# Patient Record
Sex: MALE | Race: BLACK OR AFRICAN AMERICAN | NOT HISPANIC OR LATINO | URBAN - METROPOLITAN AREA
[De-identification: names, ages, dates, MRNs, and addresses within clinical notes are randomized per-mention and may not be internally consistent; named-entity substitution may affect disease eponyms.]

---

## 2020-04-16 ENCOUNTER — INPATIENT (INPATIENT)
Facility: HOSPITAL | Age: 35
LOS: 20 days | Discharge: HOME | End: 2020-05-07
Attending: HOSPITALIST | Admitting: HOSPITALIST
Payer: MEDICAID

## 2020-04-16 VITALS
DIASTOLIC BLOOD PRESSURE: 78 MMHG | OXYGEN SATURATION: 90 % | RESPIRATION RATE: 21 BRPM | WEIGHT: 229.94 LBS | HEART RATE: 120 BPM | HEIGHT: 71 IN | SYSTOLIC BLOOD PRESSURE: 127 MMHG | TEMPERATURE: 100 F

## 2020-04-16 DIAGNOSIS — J96.00 ACUTE RESPIRATORY FAILURE, UNSPECIFIED WHETHER WITH HYPOXIA OR HYPERCAPNIA: ICD-10-CM

## 2020-04-16 DIAGNOSIS — U07.1 COVID-19: ICD-10-CM

## 2020-04-16 LAB
ALBUMIN SERPL ELPH-MCNC: 2.6 G/DL — LOW (ref 3.3–5)
ALP SERPL-CCNC: 55 U/L — SIGNIFICANT CHANGE UP (ref 40–120)
ALT FLD-CCNC: 48 U/L — SIGNIFICANT CHANGE UP (ref 12–78)
ANION GAP SERPL CALC-SCNC: 9 MMOL/L — SIGNIFICANT CHANGE UP (ref 5–17)
APTT BLD: 34 SEC — SIGNIFICANT CHANGE UP (ref 28.5–37)
AST SERPL-CCNC: 64 U/L — HIGH (ref 15–37)
BASE EXCESS BLDA CALC-SCNC: 1 MMOL/L — SIGNIFICANT CHANGE UP (ref -2–2)
BASOPHILS # BLD AUTO: 0.03 K/UL — SIGNIFICANT CHANGE UP (ref 0–0.2)
BASOPHILS NFR BLD AUTO: 0.3 % — SIGNIFICANT CHANGE UP (ref 0–2)
BILIRUB SERPL-MCNC: 0.4 MG/DL — SIGNIFICANT CHANGE UP (ref 0.2–1.2)
BLOOD GAS COMMENTS: SIGNIFICANT CHANGE UP
BLOOD GAS COMMENTS: SIGNIFICANT CHANGE UP
BLOOD GAS SOURCE: SIGNIFICANT CHANGE UP
BUN SERPL-MCNC: 12 MG/DL — SIGNIFICANT CHANGE UP (ref 7–23)
CALCIUM SERPL-MCNC: 8.6 MG/DL — SIGNIFICANT CHANGE UP (ref 8.5–10.1)
CHLORIDE SERPL-SCNC: 105 MMOL/L — SIGNIFICANT CHANGE UP (ref 96–108)
CO2 SERPL-SCNC: 26 MMOL/L — SIGNIFICANT CHANGE UP (ref 22–31)
CREAT SERPL-MCNC: 1.25 MG/DL — SIGNIFICANT CHANGE UP (ref 0.5–1.3)
CRP SERPL-MCNC: 21.36 MG/DL — HIGH (ref 0–0.4)
D DIMER BLD IA.RAPID-MCNC: 528 NG/ML DDU — HIGH
EOSINOPHIL # BLD AUTO: 0.02 K/UL — SIGNIFICANT CHANGE UP (ref 0–0.5)
EOSINOPHIL NFR BLD AUTO: 0.2 % — SIGNIFICANT CHANGE UP (ref 0–6)
FERRITIN SERPL-MCNC: 725 NG/ML — HIGH (ref 30–400)
FIBRINOGEN PPP-MCNC: 1213 MG/DL — HIGH (ref 350–510)
GLUCOSE SERPL-MCNC: 119 MG/DL — HIGH (ref 70–99)
HCO3 BLDA-SCNC: 25 MMOL/L — SIGNIFICANT CHANGE UP (ref 21–29)
HCT VFR BLD CALC: 36.9 % — LOW (ref 39–50)
HGB BLD-MCNC: 12.4 G/DL — LOW (ref 13–17)
HOROWITZ INDEX BLDA+IHG-RTO: 40 — SIGNIFICANT CHANGE UP
IMM GRANULOCYTES NFR BLD AUTO: 0.6 % — SIGNIFICANT CHANGE UP (ref 0–1.5)
INR BLD: 1.34 RATIO — HIGH (ref 0.88–1.16)
LACTATE SERPL-SCNC: 1.4 MMOL/L — SIGNIFICANT CHANGE UP (ref 0.7–2)
LDH SERPL L TO P-CCNC: 497 U/L — HIGH (ref 50–242)
LYMPHOCYTES # BLD AUTO: 1.13 K/UL — SIGNIFICANT CHANGE UP (ref 1–3.3)
LYMPHOCYTES # BLD AUTO: 10.8 % — LOW (ref 13–44)
MCHC RBC-ENTMCNC: 29.3 PG — SIGNIFICANT CHANGE UP (ref 27–34)
MCHC RBC-ENTMCNC: 33.6 GM/DL — SIGNIFICANT CHANGE UP (ref 32–36)
MCV RBC AUTO: 87.2 FL — SIGNIFICANT CHANGE UP (ref 80–100)
MONOCYTES # BLD AUTO: 0.43 K/UL — SIGNIFICANT CHANGE UP (ref 0–0.9)
MONOCYTES NFR BLD AUTO: 4.1 % — SIGNIFICANT CHANGE UP (ref 2–14)
NEUTROPHILS # BLD AUTO: 8.8 K/UL — HIGH (ref 1.8–7.4)
NEUTROPHILS NFR BLD AUTO: 84 % — HIGH (ref 43–77)
NRBC # BLD: 0 /100 WBCS — SIGNIFICANT CHANGE UP (ref 0–0)
PCO2 BLDA: 40 MMHG — SIGNIFICANT CHANGE UP (ref 32–46)
PH BLD: 7.41 — SIGNIFICANT CHANGE UP (ref 7.35–7.45)
PLATELET # BLD AUTO: 261 K/UL — SIGNIFICANT CHANGE UP (ref 150–400)
PO2 BLDA: 86 MMHG — SIGNIFICANT CHANGE UP (ref 74–108)
POTASSIUM SERPL-MCNC: 4.4 MMOL/L — SIGNIFICANT CHANGE UP (ref 3.5–5.3)
POTASSIUM SERPL-SCNC: 4.4 MMOL/L — SIGNIFICANT CHANGE UP (ref 3.5–5.3)
PROCALCITONIN SERPL-MCNC: 0.15 NG/ML — HIGH (ref 0.02–0.1)
PROT SERPL-MCNC: 8.4 GM/DL — HIGH (ref 6–8.3)
PROTHROM AB SERPL-ACNC: 15.2 SEC — HIGH (ref 10–12.9)
RBC # BLD: 4.23 M/UL — SIGNIFICANT CHANGE UP (ref 4.2–5.8)
RBC # FLD: 13.2 % — SIGNIFICANT CHANGE UP (ref 10.3–14.5)
SAO2 % BLDA: 96 % — SIGNIFICANT CHANGE UP (ref 92–96)
SARS-COV-2 RNA SPEC QL NAA+PROBE: DETECTED
SODIUM SERPL-SCNC: 140 MMOL/L — SIGNIFICANT CHANGE UP (ref 135–145)
WBC # BLD: 10.47 K/UL — SIGNIFICANT CHANGE UP (ref 3.8–10.5)
WBC # FLD AUTO: 10.47 K/UL — SIGNIFICANT CHANGE UP (ref 3.8–10.5)

## 2020-04-16 PROCEDURE — 99223 1ST HOSP IP/OBS HIGH 75: CPT

## 2020-04-16 PROCEDURE — 99285 EMERGENCY DEPT VISIT HI MDM: CPT

## 2020-04-16 PROCEDURE — 93010 ELECTROCARDIOGRAM REPORT: CPT

## 2020-04-16 PROCEDURE — 71045 X-RAY EXAM CHEST 1 VIEW: CPT | Mod: 26

## 2020-04-16 RX ORDER — TOCILIZUMAB 20 MG/ML
400 INJECTION, SOLUTION, CONCENTRATE INTRAVENOUS ONCE
Refills: 0 | Status: COMPLETED | OUTPATIENT
Start: 2020-04-16 | End: 2020-04-16

## 2020-04-16 RX ORDER — HYDROXYCHLOROQUINE SULFATE 200 MG
400 TABLET ORAL EVERY 24 HOURS
Refills: 0 | Status: COMPLETED | OUTPATIENT
Start: 2020-04-17 | End: 2020-04-20

## 2020-04-16 RX ORDER — HYDROXYCHLOROQUINE SULFATE 200 MG
TABLET ORAL
Refills: 0 | Status: COMPLETED | OUTPATIENT
Start: 2020-04-16 | End: 2020-04-20

## 2020-04-16 RX ORDER — ACETAMINOPHEN 500 MG
650 TABLET ORAL ONCE
Refills: 0 | Status: DISCONTINUED | OUTPATIENT
Start: 2020-04-16 | End: 2020-04-16

## 2020-04-16 RX ORDER — ATAZANAVIR 200 MG/1
300 CAPSULE ORAL DAILY
Refills: 0 | Status: DISCONTINUED | OUTPATIENT
Start: 2020-04-16 | End: 2020-04-16

## 2020-04-16 RX ORDER — ATAZANAVIR 200 MG/1
300 CAPSULE ORAL EVERY 24 HOURS
Refills: 0 | Status: COMPLETED | OUTPATIENT
Start: 2020-04-16 | End: 2020-04-20

## 2020-04-16 RX ORDER — PANTOPRAZOLE SODIUM 20 MG/1
40 TABLET, DELAYED RELEASE ORAL ONCE
Refills: 0 | Status: COMPLETED | OUTPATIENT
Start: 2020-04-16 | End: 2020-04-16

## 2020-04-16 RX ORDER — SODIUM CHLORIDE 9 MG/ML
1000 INJECTION INTRAMUSCULAR; INTRAVENOUS; SUBCUTANEOUS ONCE
Refills: 0 | Status: COMPLETED | OUTPATIENT
Start: 2020-04-16 | End: 2020-04-16

## 2020-04-16 RX ORDER — ENOXAPARIN SODIUM 100 MG/ML
40 INJECTION SUBCUTANEOUS EVERY 12 HOURS
Refills: 0 | Status: DISCONTINUED | OUTPATIENT
Start: 2020-04-16 | End: 2020-05-07

## 2020-04-16 RX ORDER — ALBUTEROL 90 UG/1
2 AEROSOL, METERED ORAL EVERY 4 HOURS
Refills: 0 | Status: DISCONTINUED | OUTPATIENT
Start: 2020-04-16 | End: 2020-05-07

## 2020-04-16 RX ORDER — GUAIFENESIN/DEXTROMETHORPHAN 600MG-30MG
10 TABLET, EXTENDED RELEASE 12 HR ORAL EVERY 4 HOURS
Refills: 0 | Status: DISCONTINUED | OUTPATIENT
Start: 2020-04-16 | End: 2020-05-07

## 2020-04-16 RX ORDER — ACETAMINOPHEN 500 MG
650 TABLET ORAL EVERY 4 HOURS
Refills: 0 | Status: DISCONTINUED | OUTPATIENT
Start: 2020-04-16 | End: 2020-05-07

## 2020-04-16 RX ORDER — HYDROXYCHLOROQUINE SULFATE 200 MG
800 TABLET ORAL EVERY 24 HOURS
Refills: 0 | Status: COMPLETED | OUTPATIENT
Start: 2020-04-16 | End: 2020-04-16

## 2020-04-16 RX ORDER — ONDANSETRON 8 MG/1
4 TABLET, FILM COATED ORAL EVERY 6 HOURS
Refills: 0 | Status: DISCONTINUED | OUTPATIENT
Start: 2020-04-16 | End: 2020-05-07

## 2020-04-16 RX ADMIN — Medication 800 MILLIGRAM(S): at 13:51

## 2020-04-16 RX ADMIN — ATAZANAVIR 300 MILLIGRAM(S): 200 CAPSULE ORAL at 13:50

## 2020-04-16 RX ADMIN — Medication 40 MILLIGRAM(S): at 17:57

## 2020-04-16 RX ADMIN — Medication 100 MILLIGRAM(S): at 23:20

## 2020-04-16 RX ADMIN — Medication 650 MILLIGRAM(S): at 15:52

## 2020-04-16 RX ADMIN — ENOXAPARIN SODIUM 40 MILLIGRAM(S): 100 INJECTION SUBCUTANEOUS at 17:57

## 2020-04-16 RX ADMIN — Medication 40 MILLIGRAM(S): at 12:18

## 2020-04-16 RX ADMIN — SODIUM CHLORIDE 1000 MILLILITER(S): 9 INJECTION INTRAMUSCULAR; INTRAVENOUS; SUBCUTANEOUS at 11:41

## 2020-04-16 RX ADMIN — Medication 10 MILLILITER(S): at 21:03

## 2020-04-16 RX ADMIN — TOCILIZUMAB 100 MILLIGRAM(S): 20 INJECTION, SOLUTION, CONCENTRATE INTRAVENOUS at 17:57

## 2020-04-16 RX ADMIN — PANTOPRAZOLE SODIUM 40 MILLIGRAM(S): 20 TABLET, DELAYED RELEASE ORAL at 12:21

## 2020-04-16 RX ADMIN — Medication 10 MILLILITER(S): at 17:58

## 2020-04-16 NOTE — H&P ADULT - NSHPPHYSICALEXAM_GEN_ALL_CORE
GENERAL: NAD, respiratory distress   HEAD:  Atraumatic, Normocephalic  EYES: EOMI, PERRLA, conjunctiva and sclera clear  ENMT: No tonsillar erythema, exudates, or enlargement; Moist mucous membranes, Good dentition, No lesions  NECK: Supple, No JVD, Normal thyroid  NERVOUS SYSTEM:  Alert & Oriented X3, Good concentration; Motor Strength 5/5 B/L upper and lower extremities; DTRs 2+ intact and symmetric  CHEST/LUNG: cough   HEART: Regular rate and rhythm; No murmurs, rubs, or gallops  ABDOMEN: Soft, Nontender, Nondistended; Bowel sounds present  EXTREMITIES:  2+ Peripheral Pulses, No clubbing, cyanosis, or edema  LYMPH: No lymphadenopathy noted  SKIN: No rashes or lesions

## 2020-04-16 NOTE — ED PROVIDER NOTE - OBJECTIVE STATEMENT
No PMHx +3 days of SOB/cough/congestion, nausea only able to tolerate soup, 2 days ago went to urgent care, at that time had CXR showing pneumonia consistent with COVID --  subsequently started on cefdinir and azithromycin, states that he has progressively worsened instead of improving, has been having high fevers to 103F, used APAP this AM for this approx 2 hrs pta, took 650mg. Denies other new symptoms of concern.

## 2020-04-16 NOTE — ED PROVIDER NOTE - CLINICAL SUMMARY MEDICAL DECISION MAKING FREE TEXT BOX
34M no pmhx presenting on day three of treatment with cefdinir + azithromycin for diagnosed pneumonia via XR, concern for COVID elevated given patient hx and non-improvement with antibiotics, will check COVID labs including nasal swab, follow up studies, reassess, dispo likely admission for hypoxia to 90% on RA.

## 2020-04-16 NOTE — H&P ADULT - NSHPREVIEWOFSYSTEMS_GEN_ALL_CORE
CONSTITUTIONAL:  fever, r fatigue  EYES: No eye pain, visual disturbances, or discharge  ENMT:  No difficulty hearing, tinnitus, vertigo; No sinus or throat pain  NECK: No pain or stiffness  RESPIRATORY: cough  shortness of breath  CARDIOVASCULAR: No chest pain, palpitations, dizziness, or leg swelling  GASTROINTESTINAL: No abdominal or epigastric pain. No nausea, vomiting, or hematemesis; No diarrhea or constipation. No melena or hematochezia.  GENITOURINARY: No dysuria, frequency, hematuria, or incontinence  NEUROLOGICAL: No headaches, memory loss, loss of strength, numbness, or tremors  SKIN: No itching, burning, rashes, or lesions   LYMPH NODES: No enlarged glands  ENDOCRINE: No heat or cold intolerance; No hair loss  MUSCULOSKELETAL: No joint pain or swelling; No muscle, back, or extremity pain  PSYCHIATRIC: No depression, anxiety, mood swings, or difficulty sleeping  HEME/LYMPH: No easy bruising, or bleeding gums  ALLERGY AND IMMUNOLOGIC: No hives or eczema

## 2020-04-16 NOTE — H&P ADULT - HISTORY OF PRESENT ILLNESS
No PMHx +3 days of SOB/cough/congestion, nausea only able to tolerate soup, 2 days ago went to urgent care, at that time had CXR showing pneumonia consistent with COVID --  subsequently started on cefdinir and azithromycin, states that he has progressively worsened instead of improving, has been having high fevers to 103F, used APAP this AM for this approx 2 hrs pta, took 650mg. Denies other new symptoms of concern

## 2020-04-16 NOTE — ED PROVIDER NOTE - ATTENDING CONTRIBUTION TO CARE
34 years old male by ems c/o l03 temp this morning took tylenol 2 hours ago. Pt c/o cough fever, generalized weakness and sob especially on exertion for one week Pt denies headache, dizziness, blurred visions, focal/distal weakness or numbness, chest pain, nausea, vomiting, abd pain, pt is speaking in clear full sentences Pt is on 5liter NC oxygen sat 90%. + rales abd is soft nontender to palp Agree with Dr. Jaren robison/treat/dispo.

## 2020-04-16 NOTE — ED PROVIDER NOTE - PHYSICAL EXAMINATION
Gen: NAD, non-toxic, conversational  Eyes: PERRLA, EOMI   HENT: Normocephalic, atraumatic. External ears normal, no rhinorrhea, moist mucous membranes.   CV: RRR, no M/R/G  Resp: CTAB, non-labored, speaking without difficulty on 3L O2NC  Abd: soft, non tender, non rigid, no guarding or rebound tenderness  Back: No CVAT bilaterally, no midline ttp  Skin: dry, wwp   Neuro: AOx3, speech is fluent and appropriate  Psych: Mood okay, affect euthymic

## 2020-04-16 NOTE — H&P ADULT - ASSESSMENT
33 y/o -american with CVID 19           COVID Treatment Plan:    #Viral Pneumonia secondary to Novel Coronavirus Infection    - Maintain on airborne isolation.    - Continue with O2 as needed via nasal cannula and up-titrate as needed. If on non-rebreather mask, start continuous oximetry monitoring.    - Obtain daily room air O2 saturations once O2 requirements stabilize.    - Start hydroxychloroquine, with plan for 5 day course. EKG reviewed on admission and QTc < 500 msec. No need for further cardiac monitoring.    - Acetaminophen 650 mg PO q4h PRN fever. Limit use of NSAIDs.    - HFA albuterol Q6 hour PRN via MDI. Would avoid nebulized preparations to limit risk of aerosol formation.    - Defer systemic steroids/interleukin inhibitor at this time; would consider if inflammatory markers are elevated and patient has worsening hypoxia.    - Labs Testing: Daily or As Needed: CBC w/ diff, CMP; Every 3 days: CRP, Ferritin, Procalcitonin.    - Start pharmacologic DVT PPx: Lovenox 40 mg SQ daily if BMI < 30; Lovenox 40 mg SQ BID if BMI > 30. If CrCl < 15, use heparin. Will consider need for extended prophylaxis prior to discharge based on D-Dimer and calculated VTE risk.    - Goals of Care discussion had with patient/surrogate: (free text here)

## 2020-04-16 NOTE — H&P ADULT - NSHPLABSRESULTS_GEN_ALL_CORE
< from: Xray Chest 1 View-PORTABLE IMMEDIATE (04.16.20 @ 12:20) >    IMPRESSION:    Findings of bilateral lung opacities are suspicious for pneumonia.    < end of copied text >                          12.4   10.47 )-----------( 261      ( 16 Apr 2020 11:40 )             36.9     04-16    140  |  105  |  12  ----------------------------<  119<H>  4.4   |  26  |  1.25    Ca    8.6      16 Apr 2020 11:40    TPro  8.4<H>  /  Alb  2.6<L>  /  TBili  0.4  /  DBili  x   /  AST  64<H>  /  ALT  48  /  AlkPhos  55  04-16    PT/INR - ( 16 Apr 2020 11:40 )   PT: 15.2 sec;   INR: 1.34 ratio         PTT - ( 16 Apr 2020 11:40 )  PTT:34.0 sec

## 2020-04-17 LAB
ALBUMIN SERPL ELPH-MCNC: 2.4 G/DL — LOW (ref 3.3–5)
ALP SERPL-CCNC: 58 U/L — SIGNIFICANT CHANGE UP (ref 40–120)
ALT FLD-CCNC: 64 U/L — SIGNIFICANT CHANGE UP (ref 12–78)
ANION GAP SERPL CALC-SCNC: 4 MMOL/L — LOW (ref 5–17)
AST SERPL-CCNC: 72 U/L — HIGH (ref 15–37)
BILIRUB SERPL-MCNC: 0.3 MG/DL — SIGNIFICANT CHANGE UP (ref 0.2–1.2)
BUN SERPL-MCNC: 16 MG/DL — SIGNIFICANT CHANGE UP (ref 7–23)
CALCIUM SERPL-MCNC: 8.7 MG/DL — SIGNIFICANT CHANGE UP (ref 8.5–10.1)
CHLORIDE SERPL-SCNC: 108 MMOL/L — SIGNIFICANT CHANGE UP (ref 96–108)
CK SERPL-CCNC: 389 U/L — HIGH (ref 26–308)
CO2 SERPL-SCNC: 28 MMOL/L — SIGNIFICANT CHANGE UP (ref 22–31)
CREAT SERPL-MCNC: 1.13 MG/DL — SIGNIFICANT CHANGE UP (ref 0.5–1.3)
CRP SERPL-MCNC: 18.89 MG/DL — HIGH (ref 0–0.4)
D DIMER BLD IA.RAPID-MCNC: 446 NG/ML DDU — HIGH
FERRITIN SERPL-MCNC: 817 NG/ML — HIGH (ref 30–400)
GLUCOSE SERPL-MCNC: 160 MG/DL — HIGH (ref 70–99)
HCT VFR BLD CALC: 35 % — LOW (ref 39–50)
HGB BLD-MCNC: 11.5 G/DL — LOW (ref 13–17)
LACTATE SERPL-SCNC: 1.2 MMOL/L — SIGNIFICANT CHANGE UP (ref 0.7–2)
LDH SERPL L TO P-CCNC: 524 U/L — HIGH (ref 50–242)
MAGNESIUM SERPL-MCNC: 2.6 MG/DL — SIGNIFICANT CHANGE UP (ref 1.6–2.6)
MCHC RBC-ENTMCNC: 29 PG — SIGNIFICANT CHANGE UP (ref 27–34)
MCHC RBC-ENTMCNC: 32.9 GM/DL — SIGNIFICANT CHANGE UP (ref 32–36)
MCV RBC AUTO: 88.2 FL — SIGNIFICANT CHANGE UP (ref 80–100)
NRBC # BLD: 0 /100 WBCS — SIGNIFICANT CHANGE UP (ref 0–0)
PHOSPHATE SERPL-MCNC: 3.2 MG/DL — SIGNIFICANT CHANGE UP (ref 2.5–4.5)
PLATELET # BLD AUTO: 315 K/UL — SIGNIFICANT CHANGE UP (ref 150–400)
POTASSIUM SERPL-MCNC: 4.8 MMOL/L — SIGNIFICANT CHANGE UP (ref 3.5–5.3)
POTASSIUM SERPL-SCNC: 4.8 MMOL/L — SIGNIFICANT CHANGE UP (ref 3.5–5.3)
PROCALCITONIN SERPL-MCNC: 0.11 NG/ML — HIGH (ref 0.02–0.1)
PROT SERPL-MCNC: 7.7 GM/DL — SIGNIFICANT CHANGE UP (ref 6–8.3)
RBC # BLD: 3.97 M/UL — LOW (ref 4.2–5.8)
RBC # FLD: 13.2 % — SIGNIFICANT CHANGE UP (ref 10.3–14.5)
SODIUM SERPL-SCNC: 140 MMOL/L — SIGNIFICANT CHANGE UP (ref 135–145)
WBC # BLD: 10.48 K/UL — SIGNIFICANT CHANGE UP (ref 3.8–10.5)
WBC # FLD AUTO: 10.48 K/UL — SIGNIFICANT CHANGE UP (ref 3.8–10.5)

## 2020-04-17 PROCEDURE — 99233 SBSQ HOSP IP/OBS HIGH 50: CPT

## 2020-04-17 RX ORDER — TOCILIZUMAB 20 MG/ML
400 INJECTION, SOLUTION, CONCENTRATE INTRAVENOUS ONCE
Refills: 0 | Status: COMPLETED | OUTPATIENT
Start: 2020-04-17 | End: 2020-04-17

## 2020-04-17 RX ADMIN — ATAZANAVIR 300 MILLIGRAM(S): 200 CAPSULE ORAL at 11:53

## 2020-04-17 RX ADMIN — Medication 40 MILLIGRAM(S): at 17:15

## 2020-04-17 RX ADMIN — Medication 40 MILLIGRAM(S): at 05:44

## 2020-04-17 RX ADMIN — TOCILIZUMAB 100 MILLIGRAM(S): 20 INJECTION, SOLUTION, CONCENTRATE INTRAVENOUS at 18:50

## 2020-04-17 RX ADMIN — ENOXAPARIN SODIUM 40 MILLIGRAM(S): 100 INJECTION SUBCUTANEOUS at 17:14

## 2020-04-17 RX ADMIN — Medication 650 MILLIGRAM(S): at 05:53

## 2020-04-17 RX ADMIN — ENOXAPARIN SODIUM 40 MILLIGRAM(S): 100 INJECTION SUBCUTANEOUS at 05:44

## 2020-04-17 RX ADMIN — Medication 400 MILLIGRAM(S): at 12:23

## 2020-04-17 RX ADMIN — Medication 10 MILLILITER(S): at 05:43

## 2020-04-17 NOTE — PROGRESS NOTE ADULT - SUBJECTIVE AND OBJECTIVE BOX
HPI:  No PMHx +3 days of SOB/cough/congestion, nausea only able to tolerate soup, 2 days ago went to urgent care, at that time had CXR showing pneumonia consistent with COVID --  subsequently started on cefdinir and azithromycin, states that he has progressively worsened instead of improving, has been having high fevers to 103F, used APAP this AM for this approx 2 hrs pta, took 650mg. Denies other new symptoms of concern (16 Apr 2020 13:33)    Patient is a 34y old  Male who presents with a chief complaint of Covid (16 Apr 2020 13:33)      INTERVAL HPI/OVERNIGHT EVENTS: new admission     MEDICATIONS  (STANDING):  atazanavir 300 milliGRAM(s) Oral every 24 hours  enoxaparin Injectable 40 milliGRAM(s) SubCutaneous every 12 hours  hydroxychloroquine   Oral   hydroxychloroquine 400 milliGRAM(s) Oral every 24 hours  methylPREDNISolone sodium succinate Injectable 40 milliGRAM(s) IV Push every 12 hours    MEDICATIONS  (PRN):  acetaminophen   Tablet .. 650 milliGRAM(s) Oral every 4 hours PRN Temp greater or equal to 38.5C (101.3F)  acetaminophen  Suppository .. 650 milliGRAM(s) Rectal every 4 hours PRN Temp greater or equal to 38.5C (101.3F)  ALBUTerol    90 MICROgram(s) HFA Inhaler 2 Puff(s) Inhalation every 4 hours PRN Shortness of Breath and/or Wheezing  benzonatate 100 milliGRAM(s) Oral three times a day PRN Cough  guaifenesin/dextromethorphan  Syrup 10 milliLiter(s) Oral every 4 hours PRN Cough  hydrocodone/homatropine Syrup 5 milliLiter(s) Oral two times a day PRN Cough  ondansetron Injectable 4 milliGRAM(s) IV Push every 6 hours PRN Nausea and/or Vomiting      Allergies    No Known Allergies    Intolerances        REVIEW OF SYSTEMS:  CONSTITUTIONAL: No fever, weight loss, or fatigue  EYES: No eye pain, visual disturbances, or discharge  ENMT:  No difficulty hearing, tinnitus, vertigo; No sinus or throat pain  NECK: No pain or stiffness  BREASTS: No pain, masses, or nipple discharge  RESPIRATORY: cough  shortness of breath  CARDIOVASCULAR: No chest pain, palpitations, dizziness, or leg swelling  GASTROINTESTINAL: No abdominal or epigastric pain. No nausea, vomiting, or hematemesis; No diarrhea or constipation. No melena or hematochezia.  GENITOURINARY: No dysuria, frequency, hematuria, or incontinence  NEUROLOGICAL: No headaches, memory loss, loss of strength, numbness, or tremors  SKIN: No itching, burning, rashes, or lesions   LYMPH NODES: No enlarged glands  ENDOCRINE: No heat or cold intolerance; No hair loss  MUSCULOSKELETAL: No joint pain or swelling; No muscle, back, or extremity pain  PSYCHIATRIC: No depression, anxiety, mood swings, or difficulty sleeping  HEME/LYMPH: No easy bruising, or bleeding gums  ALLERGY AND IMMUNOLOGIC: No hives or eczema    Vital Signs Last 24 Hrs  T(C): 36.5 (17 Apr 2020 11:08), Max: 37.8 (16 Apr 2020 22:11)  T(F): 97.7 (17 Apr 2020 11:08), Max: 100.1 (16 Apr 2020 22:11)  HR: 82 (17 Apr 2020 11:08) (82 - 97)  BP: 109/72 (17 Apr 2020 11:08) (109/71 - 127/73)  BP(mean): --  RR: 19 (17 Apr 2020 11:08) (18 - 20)  SpO2: 97% (17 Apr 2020 11:08) (92% - 99%)    PHYSICAL EXAM:  GENERAL: NAD, well-groomed, well-developed  HEAD:  Atraumatic, Normocephalic  EYES: EOMI, PERRLA, conjunctiva and sclera clear  ENMT: No tonsillar erythema, exudates, or enlargement; Moist mucous membranes, Good dentition, No lesions  NECK: Supple, No JVD, Normal thyroid  NERVOUS SYSTEM:  Alert & Oriented X3, Good concentration; Motor Strength 5/5 B/L upper and lower extremities; DTRs 2+ intact and symmetric  CHEST/LUNG: Clear to percussion bilaterally; No rales, rhonchi, wheezing, or rubs  HEART: Regular rate and rhythm; No murmurs, rubs, or gallops  ABDOMEN: Soft, Nontender, Nondistended; Bowel sounds present  EXTREMITIES:  2+ Peripheral Pulses, No clubbing, cyanosis, or edema  LYMPH: No lymphadenopathy noted  SKIN: No rashes or lesions    LABS:                        11.5   10.48 )-----------( 315      ( 17 Apr 2020 07:33 )             35.0     04-17    140  |  108  |  16  ----------------------------<  160<H>  4.8   |  28  |  1.13    Ca    8.7      17 Apr 2020 07:33  Phos  3.2     04-17  Mg     2.6     04-17    TPro  7.7  /  Alb  2.4<L>  /  TBili  0.3  /  DBili  x   /  AST  72<H>  /  ALT  64  /  AlkPhos  58  04-17    PT/INR - ( 16 Apr 2020 11:40 )   PT: 15.2 sec;   INR: 1.34 ratio         PTT - ( 16 Apr 2020 11:40 )  PTT:34.0 sec    CAPILLARY BLOOD GLUCOSE          RADIOLOGY & ADDITIONAL TESTS:    Imaging Personally Reviewed:  [X] YES  [ ] NO    Consultant(s) Notes Reviewed:  [ X] YES  [ ] NO    Care Discussed with Consultants/Other Providers [X ] YES  [ ] NO

## 2020-04-18 LAB
ANION GAP SERPL CALC-SCNC: 6 MMOL/L — SIGNIFICANT CHANGE UP (ref 5–17)
BUN SERPL-MCNC: 21 MG/DL — SIGNIFICANT CHANGE UP (ref 7–23)
CALCIUM SERPL-MCNC: 8.8 MG/DL — SIGNIFICANT CHANGE UP (ref 8.5–10.1)
CHLORIDE SERPL-SCNC: 102 MMOL/L — SIGNIFICANT CHANGE UP (ref 96–108)
CO2 SERPL-SCNC: 28 MMOL/L — SIGNIFICANT CHANGE UP (ref 22–31)
CREAT SERPL-MCNC: 1.19 MG/DL — SIGNIFICANT CHANGE UP (ref 0.5–1.3)
CRP SERPL-MCNC: 7.46 MG/DL — HIGH (ref 0–0.4)
D DIMER BLD IA.RAPID-MCNC: 413 NG/ML DDU — HIGH
FERRITIN SERPL-MCNC: 821 NG/ML — HIGH (ref 30–400)
GLUCOSE SERPL-MCNC: 138 MG/DL — HIGH (ref 70–99)
HCT VFR BLD CALC: 36.9 % — LOW (ref 39–50)
HGB BLD-MCNC: 12.1 G/DL — LOW (ref 13–17)
MCHC RBC-ENTMCNC: 29.2 PG — SIGNIFICANT CHANGE UP (ref 27–34)
MCHC RBC-ENTMCNC: 32.8 GM/DL — SIGNIFICANT CHANGE UP (ref 32–36)
MCV RBC AUTO: 88.9 FL — SIGNIFICANT CHANGE UP (ref 80–100)
NRBC # BLD: 0 /100 WBCS — SIGNIFICANT CHANGE UP (ref 0–0)
PLATELET # BLD AUTO: 388 K/UL — SIGNIFICANT CHANGE UP (ref 150–400)
POTASSIUM SERPL-MCNC: 4.4 MMOL/L — SIGNIFICANT CHANGE UP (ref 3.5–5.3)
POTASSIUM SERPL-SCNC: 4.4 MMOL/L — SIGNIFICANT CHANGE UP (ref 3.5–5.3)
RBC # BLD: 4.15 M/UL — LOW (ref 4.2–5.8)
RBC # FLD: 13.5 % — SIGNIFICANT CHANGE UP (ref 10.3–14.5)
SODIUM SERPL-SCNC: 136 MMOL/L — SIGNIFICANT CHANGE UP (ref 135–145)
WBC # BLD: 12.43 K/UL — HIGH (ref 3.8–10.5)
WBC # FLD AUTO: 12.43 K/UL — HIGH (ref 3.8–10.5)

## 2020-04-18 PROCEDURE — 99233 SBSQ HOSP IP/OBS HIGH 50: CPT

## 2020-04-18 RX ORDER — TOCILIZUMAB 20 MG/ML
400 INJECTION, SOLUTION, CONCENTRATE INTRAVENOUS ONCE
Refills: 0 | Status: DISCONTINUED | OUTPATIENT
Start: 2020-04-18 | End: 2020-04-18

## 2020-04-18 RX ORDER — OXYCODONE AND ACETAMINOPHEN 5; 325 MG/1; MG/1
2 TABLET ORAL EVERY 6 HOURS
Refills: 0 | Status: DISCONTINUED | OUTPATIENT
Start: 2020-04-18 | End: 2020-04-25

## 2020-04-18 RX ADMIN — ENOXAPARIN SODIUM 40 MILLIGRAM(S): 100 INJECTION SUBCUTANEOUS at 05:43

## 2020-04-18 RX ADMIN — Medication 400 MILLIGRAM(S): at 11:45

## 2020-04-18 RX ADMIN — Medication 10 MILLILITER(S): at 17:17

## 2020-04-18 RX ADMIN — ATAZANAVIR 300 MILLIGRAM(S): 200 CAPSULE ORAL at 11:45

## 2020-04-18 RX ADMIN — Medication 100 MILLIGRAM(S): at 10:58

## 2020-04-18 RX ADMIN — OXYCODONE AND ACETAMINOPHEN 2 TABLET(S): 5; 325 TABLET ORAL at 21:39

## 2020-04-18 RX ADMIN — Medication 10 MILLILITER(S): at 11:00

## 2020-04-18 RX ADMIN — Medication 650 MILLIGRAM(S): at 21:22

## 2020-04-18 RX ADMIN — Medication 100 MILLIGRAM(S): at 17:17

## 2020-04-18 RX ADMIN — Medication 40 MILLIGRAM(S): at 17:17

## 2020-04-18 RX ADMIN — ENOXAPARIN SODIUM 40 MILLIGRAM(S): 100 INJECTION SUBCUTANEOUS at 17:17

## 2020-04-18 RX ADMIN — Medication 40 MILLIGRAM(S): at 05:43

## 2020-04-18 NOTE — PROGRESS NOTE ADULT - SUBJECTIVE AND OBJECTIVE BOX
HPI:  No PMHx +3 days of SOB/cough/congestion, nausea only able to tolerate soup, 2 days ago went to urgent care, at that time had CXR showing pneumonia consistent with COVID --  subsequently started on cefdinir and azithromycin, states that he has progressively worsened instead of improving, has been having high fevers to 103F, used APAP this AM for this approx 2 hrs pta, took 650mg. Denies other new symptoms of concern (16 Apr 2020 13:33)    Patient is a 34y old  Male who presents with a chief complaint of COVID-19 (17 Apr 2020 17:25)      INTERVAL HPI/OVERNIGHT EVENTS: no acute events overnight     MEDICATIONS  (STANDING):  atazanavir 300 milliGRAM(s) Oral every 24 hours  enoxaparin Injectable 40 milliGRAM(s) SubCutaneous every 12 hours  hydroxychloroquine   Oral   hydroxychloroquine 400 milliGRAM(s) Oral every 24 hours  methylPREDNISolone sodium succinate Injectable 40 milliGRAM(s) IV Push every 12 hours    MEDICATIONS  (PRN):  acetaminophen   Tablet .. 650 milliGRAM(s) Oral every 4 hours PRN Temp greater or equal to 38.5C (101.3F)  acetaminophen  Suppository .. 650 milliGRAM(s) Rectal every 4 hours PRN Temp greater or equal to 38.5C (101.3F)  ALBUTerol    90 MICROgram(s) HFA Inhaler 2 Puff(s) Inhalation every 4 hours PRN Shortness of Breath and/or Wheezing  benzonatate 100 milliGRAM(s) Oral three times a day PRN Cough  guaifenesin/dextromethorphan  Syrup 10 milliLiter(s) Oral every 4 hours PRN Cough  hydrocodone/homatropine Syrup 5 milliLiter(s) Oral two times a day PRN Cough  ondansetron Injectable 4 milliGRAM(s) IV Push every 6 hours PRN Nausea and/or Vomiting  oxycodone    5 mG/acetaminophen 325 mG 2 Tablet(s) Oral every 6 hours PRN Moderate Pain (4 - 6)      Allergies    No Known Allergies    Intolerances        REVIEW OF SYSTEMS:  CONSTITUTIONAL: No fever, weight loss, or fatigue  EYES: No eye pain, visual disturbances, or discharge  ENMT:  No difficulty hearing, tinnitus, vertigo; No sinus or throat pain  NECK: No pain or stiffness  BREASTS: No pain, masses, or nipple discharge  RESPIRATORY:  cofugh shortness of breath  CARDIOVASCULAR: No chest pain, palpitations, dizziness, or leg swelling  GASTROINTESTINAL: No abdominal or epigastric pain. No nausea, vomiting, or hematemesis; No diarrhea or constipation. No melena or hematochezia.  GENITOURINARY: No dysuria, frequency, hematuria, or incontinence  NEUROLOGICAL: No headaches, memory loss, loss of strength, numbness, or tremors  SKIN: No itching, burning, rashes, or lesions   LYMPH NODES: No enlarged glands  ENDOCRINE: No heat or cold intolerance; No hair loss  MUSCULOSKELETAL: No joint pain or swelling; No muscle, back, or extremity pain  PSYCHIATRIC: No depression, anxiety, mood swings, or difficulty sleeping  HEME/LYMPH: No easy bruising, or bleeding gums  ALLERGY AND IMMUNOLOGIC: No hives or eczema    Vital Signs Last 24 Hrs  T(C): 36.8 (18 Apr 2020 17:18), Max: 36.9 (18 Apr 2020 04:58)  T(F): 98.3 (18 Apr 2020 17:18), Max: 98.5 (18 Apr 2020 04:58)  HR: 92 (18 Apr 2020 17:18) (70 - 92)  BP: 117/71 (18 Apr 2020 17:18) (116/73 - 132/79)  BP(mean): --  RR: 20 (18 Apr 2020 17:18) (18 - 20)  SpO2: 91% (18 Apr 2020 17:18) (91% - 93%)    PHYSICAL EXAM:  GENERAL: NAD, well-groomed, well-developed  HEAD:  Atraumatic, Normocephalic  EYES: EOMI, PERRLA, conjunctiva and sclera clear  ENMT: No tonsillar erythema, exudates, or enlargement; Moist mucous membranes, Good dentition, No lesions  NECK: Supple, No JVD, Normal thyroid  NERVOUS SYSTEM:  Alert & Oriented X3, Good concentration; Motor Strength 5/5 B/L upper and lower extremities; DTRs 2+ intact and symmetric  CHEST/LUNG: Clear to percussion bilaterally; No rales, rhonchi, wheezing, or rubs  HEART: Regular rate and rhythm; No murmurs, rubs, or gallops  ABDOMEN: Soft, Nontender, Nondistended; Bowel sounds present  EXTREMITIES:  2+ Peripheral Pulses, No clubbing, cyanosis, or edema  LYMPH: No lymphadenopathy noted  SKIN: No rashes or lesions    LABS:                        12.1   12.43 )-----------( 388      ( 18 Apr 2020 07:33 )             36.9     04-18    136  |  102  |  21  ----------------------------<  138<H>  4.4   |  28  |  1.19    Ca    8.8      18 Apr 2020 07:33  Phos  3.2     04-17  Mg     2.6     04-17    TPro  7.7  /  Alb  2.4<L>  /  TBili  0.3  /  DBili  x   /  AST  72<H>  /  ALT  64  /  AlkPhos  58  04-17        CAPILLARY BLOOD GLUCOSE          RADIOLOGY & ADDITIONAL TESTS:    Imaging Personally Reviewed:  [ X] YES  [ ] NO    Consultant(s) Notes Reviewed:  [X ] YES  [ ] NO    Care Discussed with Consultants/Other Providers [X ] YES  [ ] NO

## 2020-04-19 PROCEDURE — 99233 SBSQ HOSP IP/OBS HIGH 50: CPT

## 2020-04-19 RX ADMIN — Medication 10 MILLILITER(S): at 11:38

## 2020-04-19 RX ADMIN — Medication 400 MILLIGRAM(S): at 11:35

## 2020-04-19 RX ADMIN — Medication 100 MILLIGRAM(S): at 11:34

## 2020-04-19 RX ADMIN — OXYCODONE AND ACETAMINOPHEN 2 TABLET(S): 5; 325 TABLET ORAL at 15:18

## 2020-04-19 RX ADMIN — ATAZANAVIR 300 MILLIGRAM(S): 200 CAPSULE ORAL at 11:34

## 2020-04-19 RX ADMIN — Medication 40 MILLIGRAM(S): at 06:01

## 2020-04-19 RX ADMIN — Medication 40 MILLIGRAM(S): at 21:38

## 2020-04-19 RX ADMIN — OXYCODONE AND ACETAMINOPHEN 2 TABLET(S): 5; 325 TABLET ORAL at 21:39

## 2020-04-19 RX ADMIN — ENOXAPARIN SODIUM 40 MILLIGRAM(S): 100 INJECTION SUBCUTANEOUS at 17:18

## 2020-04-19 RX ADMIN — Medication 650 MILLIGRAM(S): at 11:36

## 2020-04-19 RX ADMIN — ENOXAPARIN SODIUM 40 MILLIGRAM(S): 100 INJECTION SUBCUTANEOUS at 06:01

## 2020-04-19 RX ADMIN — Medication 100 MILLIGRAM(S): at 06:01

## 2020-04-19 NOTE — PROGRESS NOTE ADULT - SUBJECTIVE AND OBJECTIVE BOX
HPI:  No PMHx +3 days of SOB/cough/congestion, nausea only able to tolerate soup, 2 days ago went to urgent care, at that time had CXR showing pneumonia consistent with COVID --  subsequently started on cefdinir and azithromycin, states that he has progressively worsened instead of improving, has been having high fevers to 103F, used APAP this AM for this approx 2 hrs pta, took 650mg. Denies other new symptoms of concern (16 Apr 2020 13:33)    Patient is a 34y old  Male who presents with a chief complaint of Covid (18 Apr 2020 21:04)      INTERVAL HPI/OVERNIGHT EVENTS: no acute events overnight feels ok but chest pain with cough     MEDICATIONS  (STANDING):  atazanavir 300 milliGRAM(s) Oral every 24 hours  enoxaparin Injectable 40 milliGRAM(s) SubCutaneous every 12 hours  hydroxychloroquine   Oral   hydroxychloroquine 400 milliGRAM(s) Oral every 24 hours  methylPREDNISolone sodium succinate Injectable 40 milliGRAM(s) IV Push every 12 hours    MEDICATIONS  (PRN):  acetaminophen   Tablet .. 650 milliGRAM(s) Oral every 4 hours PRN Temp greater or equal to 38.5C (101.3F)  acetaminophen  Suppository .. 650 milliGRAM(s) Rectal every 4 hours PRN Temp greater or equal to 38.5C (101.3F)  ALBUTerol    90 MICROgram(s) HFA Inhaler 2 Puff(s) Inhalation every 4 hours PRN Shortness of Breath and/or Wheezing  benzonatate 100 milliGRAM(s) Oral three times a day PRN Cough  guaifenesin/dextromethorphan  Syrup 10 milliLiter(s) Oral every 4 hours PRN Cough  hydrocodone/homatropine Syrup 5 milliLiter(s) Oral two times a day PRN Cough  ondansetron Injectable 4 milliGRAM(s) IV Push every 6 hours PRN Nausea and/or Vomiting  oxycodone    5 mG/acetaminophen 325 mG 2 Tablet(s) Oral every 6 hours PRN Moderate Pain (4 - 6)      Allergies    No Known Allergies    Intolerances        REVIEW OF SYSTEMS:  CONSTITUTIONAL: No fever, weight loss, or fatigue  EYES: No eye pain, visual disturbances, or discharge  ENMT:  No difficulty hearing, tinnitus, vertigo; No sinus or throat pain  NECK: No pain or stiffness  BREASTS: No pain, masses, or nipple discharge  RESPIRATORY: cough and  shortness of breath  CARDIOVASCULAR: No chest pain, palpitations, dizziness, or leg swelling  GASTROINTESTINAL: No abdominal or epigastric pain. No nausea, vomiting, or hematemesis; No diarrhea or constipation. No melena or hematochezia.  GENITOURINARY: No dysuria, frequency, hematuria, or incontinence  NEUROLOGICAL: No headaches, memory loss, loss of strength, numbness, or tremors  SKIN: No itching, burning, rashes, or lesions   LYMPH NODES: No enlarged glands  ENDOCRINE: No heat or cold intolerance; No hair loss  MUSCULOSKELETAL: No joint pain or swelling; No muscle, back, or extremity pain  PSYCHIATRIC: No depression, anxiety, mood swings, or difficulty sleeping  HEME/LYMPH: No easy bruising, or bleeding gums  ALLERGY AND IMMUNOLOGIC: No hives or eczema    Vital Signs Last 24 Hrs  T(C): 36.6 (19 Apr 2020 16:27), Max: 37.3 (19 Apr 2020 12:14)  T(F): 97.8 (19 Apr 2020 16:27), Max: 99.1 (19 Apr 2020 12:14)  HR: 81 (19 Apr 2020 16:27) (70 - 82)  BP: 118/76 (19 Apr 2020 16:27) (107/82 - 118/76)  BP(mean): --  RR: 19 (19 Apr 2020 16:27) (19 - 20)  SpO2: 93% (19 Apr 2020 16:27) (88% - 93%)    PHYSICAL EXAM:  GENERAL: NAD, well-groomed, well-developed  HEAD:  Atraumatic, Normocephalic  EYES: EOMI, PERRLA, conjunctiva and sclera clear  ENMT: No tonsillar erythema, exudates, or enlargement; Moist mucous membranes, Good dentition, No lesions  NECK: Supple, No JVD, Normal thyroid  NERVOUS SYSTEM:  Alert & Oriented X3, Good concentration; Motor Strength 5/5 B/L upper and lower extremities; DTRs 2+ intact and symmetric  CHEST/LUNG: Clear to percussion bilaterally; No rales, rhonchi, wheezing, or rubs  HEART: Regular rate and rhythm; No murmurs, rubs, or gallops  ABDOMEN: Soft, Nontender, Nondistended; Bowel sounds present  EXTREMITIES:  2+ Peripheral Pulses, No clubbing, cyanosis, or edema  LYMPH: No lymphadenopathy noted  SKIN: No rashes or lesions    LABS:                        12.1   12.43 )-----------( 388      ( 18 Apr 2020 07:33 )             36.9     04-18    136  |  102  |  21  ----------------------------<  138<H>  4.4   |  28  |  1.19    Ca    8.8      18 Apr 2020 07:33          CAPILLARY BLOOD GLUCOSE          RADIOLOGY & ADDITIONAL TESTS:  < from: Xray Chest 1 View-PORTABLE IMMEDIATE (04.16.20 @ 12:20) >    EXAM:  XR CHEST PORTABLE IMMED 1V                            PROCEDURE DATE:  04/16/2020          INTERPRETATION:  PROCEDURE: AP view of the chest.    CLINICAL INFORMATION: Fever and cough.    COMPARISON: None.    FINDINGS:    Lungs: There are bilateral lung opacities.  Heart: The heart is normal in size.  Mediastinum: The mediastinum is within normal limits.    IMPRESSION:    Findings of bilateral lung opacities are suspicious for pneumonia.      < end of copied text >    Imaging Personally Reviewed:  [X ] YES  [ ] NO    Consultant(s) Notes Reviewed:  [ X] YES  [ ] NO    Care Discussed with Consultants/Other Providers [X ] YES  [ ] NO

## 2020-04-20 LAB
ALBUMIN SERPL ELPH-MCNC: 2.6 G/DL — LOW (ref 3.3–5)
ALP SERPL-CCNC: 52 U/L — SIGNIFICANT CHANGE UP (ref 40–120)
ALT FLD-CCNC: 57 U/L — SIGNIFICANT CHANGE UP (ref 12–78)
ANION GAP SERPL CALC-SCNC: 7 MMOL/L — SIGNIFICANT CHANGE UP (ref 5–17)
AST SERPL-CCNC: 23 U/L — SIGNIFICANT CHANGE UP (ref 15–37)
BILIRUB SERPL-MCNC: 0.5 MG/DL — SIGNIFICANT CHANGE UP (ref 0.2–1.2)
BUN SERPL-MCNC: 20 MG/DL — SIGNIFICANT CHANGE UP (ref 7–23)
CALCIUM SERPL-MCNC: 8.7 MG/DL — SIGNIFICANT CHANGE UP (ref 8.5–10.1)
CHLORIDE SERPL-SCNC: 102 MMOL/L — SIGNIFICANT CHANGE UP (ref 96–108)
CO2 SERPL-SCNC: 27 MMOL/L — SIGNIFICANT CHANGE UP (ref 22–31)
CREAT SERPL-MCNC: 1.16 MG/DL — SIGNIFICANT CHANGE UP (ref 0.5–1.3)
CRP SERPL-MCNC: 1.95 MG/DL — HIGH (ref 0–0.4)
D DIMER BLD IA.RAPID-MCNC: 843 NG/ML DDU — HIGH
FERRITIN SERPL-MCNC: 540 NG/ML — HIGH (ref 30–400)
GLUCOSE SERPL-MCNC: 154 MG/DL — HIGH (ref 70–99)
HCT VFR BLD CALC: 39.3 % — SIGNIFICANT CHANGE UP (ref 39–50)
HGB BLD-MCNC: 12.8 G/DL — LOW (ref 13–17)
MAGNESIUM SERPL-MCNC: 2.6 MG/DL — SIGNIFICANT CHANGE UP (ref 1.6–2.6)
MCHC RBC-ENTMCNC: 28.9 PG — SIGNIFICANT CHANGE UP (ref 27–34)
MCHC RBC-ENTMCNC: 32.6 GM/DL — SIGNIFICANT CHANGE UP (ref 32–36)
MCV RBC AUTO: 88.7 FL — SIGNIFICANT CHANGE UP (ref 80–100)
NRBC # BLD: 0 /100 WBCS — SIGNIFICANT CHANGE UP (ref 0–0)
PHOSPHATE SERPL-MCNC: 4 MG/DL — SIGNIFICANT CHANGE UP (ref 2.5–4.5)
PLATELET # BLD AUTO: 455 K/UL — HIGH (ref 150–400)
POTASSIUM SERPL-MCNC: 4.6 MMOL/L — SIGNIFICANT CHANGE UP (ref 3.5–5.3)
POTASSIUM SERPL-SCNC: 4.6 MMOL/L — SIGNIFICANT CHANGE UP (ref 3.5–5.3)
PROT SERPL-MCNC: 7 GM/DL — SIGNIFICANT CHANGE UP (ref 6–8.3)
RBC # BLD: 4.43 M/UL — SIGNIFICANT CHANGE UP (ref 4.2–5.8)
RBC # FLD: 12.9 % — SIGNIFICANT CHANGE UP (ref 10.3–14.5)
SODIUM SERPL-SCNC: 136 MMOL/L — SIGNIFICANT CHANGE UP (ref 135–145)
WBC # BLD: 11.94 K/UL — HIGH (ref 3.8–10.5)
WBC # FLD AUTO: 11.94 K/UL — HIGH (ref 3.8–10.5)

## 2020-04-20 PROCEDURE — 71045 X-RAY EXAM CHEST 1 VIEW: CPT | Mod: 26

## 2020-04-20 PROCEDURE — 99233 SBSQ HOSP IP/OBS HIGH 50: CPT

## 2020-04-20 RX ADMIN — Medication 400 MILLIGRAM(S): at 13:13

## 2020-04-20 RX ADMIN — Medication 100 MILLIGRAM(S): at 05:59

## 2020-04-20 RX ADMIN — ENOXAPARIN SODIUM 40 MILLIGRAM(S): 100 INJECTION SUBCUTANEOUS at 05:59

## 2020-04-20 RX ADMIN — OXYCODONE AND ACETAMINOPHEN 2 TABLET(S): 5; 325 TABLET ORAL at 12:48

## 2020-04-20 RX ADMIN — ENOXAPARIN SODIUM 40 MILLIGRAM(S): 100 INJECTION SUBCUTANEOUS at 17:19

## 2020-04-20 RX ADMIN — Medication 40 MILLIGRAM(S): at 11:48

## 2020-04-20 RX ADMIN — OXYCODONE AND ACETAMINOPHEN 2 TABLET(S): 5; 325 TABLET ORAL at 06:17

## 2020-04-20 RX ADMIN — Medication 10 MILLILITER(S): at 20:08

## 2020-04-20 RX ADMIN — Medication 40 MILLIGRAM(S): at 21:38

## 2020-04-20 RX ADMIN — Medication 10 MILLILITER(S): at 05:58

## 2020-04-20 RX ADMIN — OXYCODONE AND ACETAMINOPHEN 2 TABLET(S): 5; 325 TABLET ORAL at 20:05

## 2020-04-20 RX ADMIN — ATAZANAVIR 300 MILLIGRAM(S): 200 CAPSULE ORAL at 11:48

## 2020-04-20 RX ADMIN — ALBUTEROL 2 PUFF(S): 90 AEROSOL, METERED ORAL at 05:58

## 2020-04-20 NOTE — PROGRESS NOTE ADULT - ASSESSMENT
COVID Treatment Plan:    #Viral Pneumonia secondary to Novel Coronavirus Infection    - Maintain on airborne isolation.    - Continue with O2 as needed via nasal cannula and up-titrate as needed. If on non-rebreather mask, start continuous oximetry monitoring.    - Obtain daily room air O2 saturations once O2 requirements stabilize.    - Start hydroxychloroquine, with plan for 5 day course. EKG reviewed on admission and QTc < 500 msec. No need for further cardiac monitoring.    - Acetaminophen 650 mg PO q4h PRN fever. Limit use of NSAIDs.    - HFA albuterol Q6 hour PRN via MDI. Would avoid nebulized preparations to limit risk of aerosol formation.    - Defer systemic steroids/interleukin inhibitor at this time; would consider if inflammatory markers are elevated and patient has worsening hypoxia.  Pt is on IV steroid due to hypoxemia, wean when his O2 sat improves on less FIO2. Consider incentive spirometry.    - Labs Testing: Daily or As Needed: CBC w/ diff, CMP; Every 3 days: CRP, Ferritin, Procalcitonin.    - Start pharmacologic DVT PPx: Lovenox 40 mg SQ daily if BMI < 30; Lovenox 40 mg SQ BID if BMI > 30. If CrCl < 15, use heparin. Will consider need for extended prophylaxis prior to discharge based on D-Dimer and calculated VTE risk.    - Goals of Care discussion had with patient/surrogate: (free text here) 33 y/o -american with CVID 19

## 2020-04-20 NOTE — PROGRESS NOTE ADULT - SUBJECTIVE AND OBJECTIVE BOX
Pt continues to cough and still requires NRB for O2 support.  Failed O2 weaning to NC.  No other complaint. .  HPI:  No PMHx +3 days of SOB/cough/congestion, nausea only able to tolerate soup, 2 days ago went to urgent care, at that time had CXR showing pneumonia consistent with COVID --  subsequently started on cefdinir and azithromycin, states that he has progressively worsened instead of improving, has been having high fevers to 103F, used APAP this AM for this approx 2 hrs pta, took 650mg. Denies other new symptoms of concern (16 Apr 2020 13:33)    Medicine Progress Note    Patient is a 34y old  Male who presents with a chief complaint of Covid (20 Apr 2020 14:49)    Pt still requires 100% NRB for O2 support.  He did not tolerate O2 weaning to NC.  He is still coughing with speech.      SUBJECTIVE / OVERNIGHT EVENTS: None    ADDITIONAL REVIEW OF SYSTEMS:    MEDICATIONS  (STANDING):  enoxaparin Injectable 40 milliGRAM(s) SubCutaneous every 12 hours  methylPREDNISolone sodium succinate Injectable 40 milliGRAM(s) IV Push every 12 hours    MEDICATIONS  (PRN):  acetaminophen   Tablet .. 650 milliGRAM(s) Oral every 4 hours PRN Temp greater or equal to 38.5C (101.3F)  acetaminophen  Suppository .. 650 milliGRAM(s) Rectal every 4 hours PRN Temp greater or equal to 38.5C (101.3F)  ALBUTerol    90 MICROgram(s) HFA Inhaler 2 Puff(s) Inhalation every 4 hours PRN Shortness of Breath and/or Wheezing  benzonatate 100 milliGRAM(s) Oral three times a day PRN Cough  guaifenesin/dextromethorphan  Syrup 10 milliLiter(s) Oral every 4 hours PRN Cough  hydrocodone/homatropine Syrup 5 milliLiter(s) Oral two times a day PRN Cough  ondansetron Injectable 4 milliGRAM(s) IV Push every 6 hours PRN Nausea and/or Vomiting  oxycodone    5 mG/acetaminophen 325 mG 2 Tablet(s) Oral every 6 hours PRN Moderate Pain (4 - 6)    CAPILLARY BLOOD GLUCOSE        I&O's Summary      PHYSICAL EXAM:  Vital Signs Last 24 Hrs  T(C): 37.1 (20 Apr 2020 12:36), Max: 37.1 (20 Apr 2020 12:36)  T(F): 98.7 (20 Apr 2020 12:36), Max: 98.7 (20 Apr 2020 12:36)  HR: 84 (20 Apr 2020 12:36) (81 - 88)  BP: 119/79 (20 Apr 2020 12:36) (118/76 - 124/88)  BP(mean): --  RR: 18 (20 Apr 2020 12:36) (18 - 20)  SpO2: 87% (20 Apr 2020 12:36) (78% - 98%)  CONSTITUTIONAL: NAD, well-developed, well-groomed  ENMT: Moist oral mucosa, no pharyngeal injection or exudates; normal dentition  RESPIRATORY: Normal respiratory effort; lungs are clear to auscultation bilaterally  CARDIOVASCULAR: Regular rate and rhythm, normal S1 and S2, no murmur/rub/gallop; No lower extremity edema; Peripheral pulses are 2+ bilaterally  ABDOMEN: Nontender to palpation, normoactive bowel sounds, no rebound/guarding; No hepatosplenomegaly  PSYCH: A+O to person, place, and time; affect appropriate  NEUROLOGY: CN 2-12 are intact and symmetric; no gross sensory deficits   SKIN: No rashes; no palpable lesions    LABS:                        12.8   11.94 )-----------( 455      ( 20 Apr 2020 07:48 )             39.3     04-20    136  |  102  |  20  ----------------------------<  154<H>  4.6   |  27  |  1.16    Ca    8.7      20 Apr 2020 07:44  Phos  4.0     04-20  Mg     2.6     04-20    TPro  7.0  /  Alb  2.6<L>  /  TBili  0.5  /  DBili  x   /  AST  23  /  ALT  57  /  AlkPhos  52  04-20              COVID-19 PCR: Detected (16 Apr 2020 11:44)      RADIOLOGY & ADDITIONAL TESTS:  Imaging from Last 24 Hours:    Electrocardiogram/QTc Interval:    COORDINATION OF CARE:  Care Discussed with Consultants/Other Providers:

## 2020-04-21 LAB
ALBUMIN SERPL ELPH-MCNC: 2.6 G/DL — LOW (ref 3.3–5)
ALP SERPL-CCNC: 56 U/L — SIGNIFICANT CHANGE UP (ref 40–120)
ALT FLD-CCNC: 43 U/L — SIGNIFICANT CHANGE UP (ref 12–78)
ANION GAP SERPL CALC-SCNC: 5 MMOL/L — SIGNIFICANT CHANGE UP (ref 5–17)
AST SERPL-CCNC: 21 U/L — SIGNIFICANT CHANGE UP (ref 15–37)
BILIRUB SERPL-MCNC: 0.5 MG/DL — SIGNIFICANT CHANGE UP (ref 0.2–1.2)
BUN SERPL-MCNC: 26 MG/DL — HIGH (ref 7–23)
CALCIUM SERPL-MCNC: 8.5 MG/DL — SIGNIFICANT CHANGE UP (ref 8.5–10.1)
CHLORIDE SERPL-SCNC: 104 MMOL/L — SIGNIFICANT CHANGE UP (ref 96–108)
CO2 SERPL-SCNC: 29 MMOL/L — SIGNIFICANT CHANGE UP (ref 22–31)
CREAT SERPL-MCNC: 1.02 MG/DL — SIGNIFICANT CHANGE UP (ref 0.5–1.3)
CRP SERPL-MCNC: 1.19 MG/DL — HIGH (ref 0–0.4)
D DIMER BLD IA.RAPID-MCNC: 612 NG/ML DDU — HIGH
D DIMER BLD IA.RAPID-MCNC: 733 NG/ML DDU — HIGH
FERRITIN SERPL-MCNC: 529 NG/ML — HIGH (ref 30–400)
GLUCOSE SERPL-MCNC: 140 MG/DL — HIGH (ref 70–99)
HCT VFR BLD CALC: 37.7 % — LOW (ref 39–50)
HGB BLD-MCNC: 12.5 G/DL — LOW (ref 13–17)
MAGNESIUM SERPL-MCNC: 2.5 MG/DL — SIGNIFICANT CHANGE UP (ref 1.6–2.6)
MCHC RBC-ENTMCNC: 29 PG — SIGNIFICANT CHANGE UP (ref 27–34)
MCHC RBC-ENTMCNC: 33.2 GM/DL — SIGNIFICANT CHANGE UP (ref 32–36)
MCV RBC AUTO: 87.5 FL — SIGNIFICANT CHANGE UP (ref 80–100)
NRBC # BLD: 0 /100 WBCS — SIGNIFICANT CHANGE UP (ref 0–0)
PHOSPHATE SERPL-MCNC: 3.6 MG/DL — SIGNIFICANT CHANGE UP (ref 2.5–4.5)
PLATELET # BLD AUTO: 391 K/UL — SIGNIFICANT CHANGE UP (ref 150–400)
POTASSIUM SERPL-MCNC: 4.7 MMOL/L — SIGNIFICANT CHANGE UP (ref 3.5–5.3)
POTASSIUM SERPL-SCNC: 4.7 MMOL/L — SIGNIFICANT CHANGE UP (ref 3.5–5.3)
PROT SERPL-MCNC: 7 GM/DL — SIGNIFICANT CHANGE UP (ref 6–8.3)
RBC # BLD: 4.31 M/UL — SIGNIFICANT CHANGE UP (ref 4.2–5.8)
RBC # FLD: 12.9 % — SIGNIFICANT CHANGE UP (ref 10.3–14.5)
SODIUM SERPL-SCNC: 138 MMOL/L — SIGNIFICANT CHANGE UP (ref 135–145)
WBC # BLD: 14.71 K/UL — HIGH (ref 3.8–10.5)
WBC # FLD AUTO: 14.71 K/UL — HIGH (ref 3.8–10.5)

## 2020-04-21 PROCEDURE — 99233 SBSQ HOSP IP/OBS HIGH 50: CPT

## 2020-04-21 RX ADMIN — Medication 40 MILLIGRAM(S): at 11:42

## 2020-04-21 RX ADMIN — OXYCODONE AND ACETAMINOPHEN 2 TABLET(S): 5; 325 TABLET ORAL at 04:39

## 2020-04-21 RX ADMIN — Medication 10 MILLILITER(S): at 04:38

## 2020-04-21 RX ADMIN — ENOXAPARIN SODIUM 40 MILLIGRAM(S): 100 INJECTION SUBCUTANEOUS at 17:27

## 2020-04-21 RX ADMIN — Medication 10 MILLILITER(S): at 18:49

## 2020-04-21 RX ADMIN — Medication 10 MILLILITER(S): at 11:47

## 2020-04-21 RX ADMIN — ENOXAPARIN SODIUM 40 MILLIGRAM(S): 100 INJECTION SUBCUTANEOUS at 06:00

## 2020-04-21 RX ADMIN — OXYCODONE AND ACETAMINOPHEN 2 TABLET(S): 5; 325 TABLET ORAL at 11:55

## 2020-04-21 RX ADMIN — OXYCODONE AND ACETAMINOPHEN 2 TABLET(S): 5; 325 TABLET ORAL at 18:47

## 2020-04-21 RX ADMIN — Medication 40 MILLIGRAM(S): at 22:25

## 2020-04-21 NOTE — PROGRESS NOTE ADULT - ASSESSMENT
35 y/o -American with COVID  19 still requiring 100 percent non rebreather but makers of inflammation slowly getting better.  Guarded prognosis  please call Wife Nancy at [phone] with updates

## 2020-04-21 NOTE — PROGRESS NOTE ADULT - SUBJECTIVE AND OBJECTIVE BOX
HPI:  No PMHx +3 days of SOB/cough/congestion, nausea only able to tolerate soup, 2 days ago went to urgent care, at that time had CXR showing pneumonia consistent with COVID --  subsequently started on cefdinir and azithromycin, states that he has progressively worsened instead of improving, has been having high fevers to 103F, used APAP this AM for this approx 2 hrs pta, took 650mg. Denies other new symptoms of concern (16 Apr 2020 13:33)    Patient is a 34y old  Male who presents with a chief complaint of Covid (20 Apr 2020 14:49)      INTERVAL HPI/OVERNIGHT EVENTS:    MEDICATIONS  (STANDING):  enoxaparin Injectable 40 milliGRAM(s) SubCutaneous every 12 hours  methylPREDNISolone sodium succinate Injectable 40 milliGRAM(s) IV Push every 12 hours    MEDICATIONS  (PRN):  acetaminophen   Tablet .. 650 milliGRAM(s) Oral every 4 hours PRN Temp greater or equal to 38.5C (101.3F)  acetaminophen  Suppository .. 650 milliGRAM(s) Rectal every 4 hours PRN Temp greater or equal to 38.5C (101.3F)  ALBUTerol    90 MICROgram(s) HFA Inhaler 2 Puff(s) Inhalation every 4 hours PRN Shortness of Breath and/or Wheezing  benzonatate 100 milliGRAM(s) Oral three times a day PRN Cough  guaifenesin/dextromethorphan  Syrup 10 milliLiter(s) Oral every 4 hours PRN Cough  hydrocodone/homatropine Syrup 5 milliLiter(s) Oral two times a day PRN Cough  ondansetron Injectable 4 milliGRAM(s) IV Push every 6 hours PRN Nausea and/or Vomiting  oxycodone    5 mG/acetaminophen 325 mG 2 Tablet(s) Oral every 6 hours PRN Moderate Pain (4 - 6)      Allergies    No Known Allergies    Intolerances        REVIEW OF SYSTEMS:  CONSTITUTIONAL: No fever, weight loss, or fatigue  EYES: No eye pain, visual disturbances, or discharge  ENMT:  No difficulty hearing, tinnitus, vertigo; No sinus or throat pain  NECK: No pain or stiffness  BREASTS: No pain, masses, or nipple discharge  RESPIRATORY: cough,  shortness of breath  CARDIOVASCULAR: No chest pain, palpitations, dizziness, or leg swelling  GASTROINTESTINAL: No abdominal or epigastric pain. No nausea, vomiting, or hematemesis; No diarrhea or constipation. No melena or hematochezia.  GENITOURINARY: No dysuria, frequency, hematuria, or incontinence  NEUROLOGICAL: No headaches, memory loss, loss of strength, numbness, or tremors  SKIN: No itching, burning, rashes, or lesions   LYMPH NODES: No enlarged glands  ENDOCRINE: No heat or cold intolerance; No hair loss  MUSCULOSKELETAL: No joint pain or swelling; No muscle, back, or extremity pain  PSYCHIATRIC: No depression, anxiety, mood swings, or difficulty sleeping  HEME/LYMPH: No easy bruising, or bleeding gums  ALLERGY AND IMMUNOLOGIC: No hives or eczema    Vital Signs Last 24 Hrs  T(C): 36.4 (21 Apr 2020 11:36), Max: 36.9 (21 Apr 2020 05:08)  T(F): 97.5 (21 Apr 2020 11:36), Max: 98.4 (21 Apr 2020 05:08)  HR: 62 (21 Apr 2020 11:36) (62 - 84)  BP: 111/62 (21 Apr 2020 11:36) (109/71 - 122/74)  BP(mean): --  RR: 18 (21 Apr 2020 11:36) (16 - 18)  SpO2: 96% (21 Apr 2020 11:36) (92% - 96%)    PHYSICAL EXAM:  GENERAL: NAD, well-groomed, well-developed  HEAD:  Atraumatic, Normocephalic  EYES: EOMI, PERRLA, conjunctiva and sclera clear  ENMT: No tonsillar erythema, exudates, or enlargement; Moist mucous membranes, Good dentition, No lesions  NECK: Supple, No JVD, Normal thyroid  NERVOUS SYSTEM:  Alert & Oriented X3, Good concentration; Motor Strength 5/5 B/L upper and lower extremities; DTRs 2+ intact and symmetric  CHEST/LUNG:  rhonchi  HEART: Regular rate and rhythm; No murmurs, rubs, or gallops  ABDOMEN: Soft, Nontender, Nondistended; Bowel sounds present  EXTREMITIES:  2+ Peripheral Pulses, No clubbing, cyanosis, or edema  LYMPH: No lymphadenopathy noted  SKIN: No rashes or lesions    LABS:                        12.5   14.71 )-----------( 391      ( 21 Apr 2020 07:30 )             37.7     04-21    138  |  104  |  26<H>  ----------------------------<  140<H>  4.7   |  29  |  1.02    Ca    8.5      21 Apr 2020 07:30  Phos  3.6     04-21  Mg     2.5     04-21    TPro  7.0  /  Alb  2.6<L>  /  TBili  0.5  /  DBili  x   /  AST  21  /  ALT  43  /  AlkPhos  56  04-21        CAPILLARY BLOOD GLUCOSE          RADIOLOGY & ADDITIONAL TESTS:  < from: Xray Chest 1 View- PORTABLE-Routine (04.20.20 @ 18:18) >  EXAM:  XR CHEST PORTABLE ROUTINE 1V                            PROCEDURE DATE:  04/20/2020          INTERPRETATION:      Portable chest radiograph        CLINICAL INFORMATION:   Short of breath. Pneumonia due to Covid 19.    TECHNIQUE:  Portable  AP view of the chest was obtained.    COMPARISON: 4/16/2020 available for review.    FINDINGS:   The lungs  show slight decrease in the conspicuity of the bilateral RIGHT greater than LEFT perihilar and peripheral lung multifocal ill-defined airspace consolidations .    The heart and mediastinum are within normal limits.    Visualized osseous structures are intact.        IMPRESSION:   Slightly diminished ill-defined opacities RIGHT greater than LEFT..    < end of copied text >    Imaging Personally Reviewed:  [ X] YES  [ ] NO    Consultant(s) Notes Reviewed:  [X ] YES  [ ] NO    Care Discussed with Consultants/Other Providers [X] YES  [ ] NO

## 2020-04-22 DIAGNOSIS — Z29.9 ENCOUNTER FOR PROPHYLACTIC MEASURES, UNSPECIFIED: ICD-10-CM

## 2020-04-22 LAB
ALBUMIN SERPL ELPH-MCNC: 2.5 G/DL — LOW (ref 3.3–5)
ALP SERPL-CCNC: 51 U/L — SIGNIFICANT CHANGE UP (ref 40–120)
ALT FLD-CCNC: 40 U/L — SIGNIFICANT CHANGE UP (ref 12–78)
ANION GAP SERPL CALC-SCNC: 7 MMOL/L — SIGNIFICANT CHANGE UP (ref 5–17)
AST SERPL-CCNC: 17 U/L — SIGNIFICANT CHANGE UP (ref 15–37)
BILIRUB SERPL-MCNC: 0.2 MG/DL — SIGNIFICANT CHANGE UP (ref 0.2–1.2)
BUN SERPL-MCNC: 22 MG/DL — SIGNIFICANT CHANGE UP (ref 7–23)
CALCIUM SERPL-MCNC: 8.5 MG/DL — SIGNIFICANT CHANGE UP (ref 8.5–10.1)
CHLORIDE SERPL-SCNC: 103 MMOL/L — SIGNIFICANT CHANGE UP (ref 96–108)
CO2 SERPL-SCNC: 27 MMOL/L — SIGNIFICANT CHANGE UP (ref 22–31)
CREAT SERPL-MCNC: 1 MG/DL — SIGNIFICANT CHANGE UP (ref 0.5–1.3)
CRP SERPL-MCNC: 0.61 MG/DL — HIGH (ref 0–0.4)
FERRITIN SERPL-MCNC: 446 NG/ML — HIGH (ref 30–400)
GLUCOSE SERPL-MCNC: 137 MG/DL — HIGH (ref 70–99)
HCT VFR BLD CALC: 39.3 % — SIGNIFICANT CHANGE UP (ref 39–50)
HGB BLD-MCNC: 12.9 G/DL — LOW (ref 13–17)
LDH SERPL L TO P-CCNC: 447 U/L — HIGH (ref 50–242)
MAGNESIUM SERPL-MCNC: 2.6 MG/DL — SIGNIFICANT CHANGE UP (ref 1.6–2.6)
MCHC RBC-ENTMCNC: 28.9 PG — SIGNIFICANT CHANGE UP (ref 27–34)
MCHC RBC-ENTMCNC: 32.8 GM/DL — SIGNIFICANT CHANGE UP (ref 32–36)
MCV RBC AUTO: 88.1 FL — SIGNIFICANT CHANGE UP (ref 80–100)
NRBC # BLD: 0 /100 WBCS — SIGNIFICANT CHANGE UP (ref 0–0)
PHOSPHATE SERPL-MCNC: 2.9 MG/DL — SIGNIFICANT CHANGE UP (ref 2.5–4.5)
PLATELET # BLD AUTO: 484 K/UL — HIGH (ref 150–400)
POTASSIUM SERPL-MCNC: 4.4 MMOL/L — SIGNIFICANT CHANGE UP (ref 3.5–5.3)
POTASSIUM SERPL-SCNC: 4.4 MMOL/L — SIGNIFICANT CHANGE UP (ref 3.5–5.3)
PROT SERPL-MCNC: 6.5 GM/DL — SIGNIFICANT CHANGE UP (ref 6–8.3)
RBC # BLD: 4.46 M/UL — SIGNIFICANT CHANGE UP (ref 4.2–5.8)
RBC # FLD: 13 % — SIGNIFICANT CHANGE UP (ref 10.3–14.5)
SODIUM SERPL-SCNC: 137 MMOL/L — SIGNIFICANT CHANGE UP (ref 135–145)
WBC # BLD: 15.84 K/UL — HIGH (ref 3.8–10.5)
WBC # FLD AUTO: 15.84 K/UL — HIGH (ref 3.8–10.5)

## 2020-04-22 PROCEDURE — 99233 SBSQ HOSP IP/OBS HIGH 50: CPT

## 2020-04-22 RX ORDER — ACETAMINOPHEN 500 MG
650 TABLET ORAL EVERY 6 HOURS
Refills: 0 | Status: DISCONTINUED | OUTPATIENT
Start: 2020-04-22 | End: 2020-05-07

## 2020-04-22 RX ADMIN — ALBUTEROL 2 PUFF(S): 90 AEROSOL, METERED ORAL at 18:34

## 2020-04-22 RX ADMIN — OXYCODONE AND ACETAMINOPHEN 2 TABLET(S): 5; 325 TABLET ORAL at 01:12

## 2020-04-22 RX ADMIN — OXYCODONE AND ACETAMINOPHEN 2 TABLET(S): 5; 325 TABLET ORAL at 22:25

## 2020-04-22 RX ADMIN — Medication 40 MILLIGRAM(S): at 09:53

## 2020-04-22 RX ADMIN — Medication 10 MILLILITER(S): at 22:25

## 2020-04-22 RX ADMIN — ENOXAPARIN SODIUM 40 MILLIGRAM(S): 100 INJECTION SUBCUTANEOUS at 04:46

## 2020-04-22 RX ADMIN — Medication 40 MILLIGRAM(S): at 22:24

## 2020-04-22 RX ADMIN — ENOXAPARIN SODIUM 40 MILLIGRAM(S): 100 INJECTION SUBCUTANEOUS at 17:34

## 2020-04-22 RX ADMIN — Medication 650 MILLIGRAM(S): at 17:58

## 2020-04-22 NOTE — DIETITIAN INITIAL EVALUATION ADULT. - PERTINENT LABORATORY DATA
04-22 Na137 mmol/L Glu 137 mg/dL<H> K+ 4.4 mmol/L Cr  1.00 mg/dL BUN 22 mg/dL 04-22 Phos 2.9 mg/dL 04-22 Alb 2.5 g/dL<L>04-22 ALT 40 U/L AST 17 U/L Alkaline Phosphatase 51 U/L

## 2020-04-22 NOTE — PROGRESS NOTE ADULT - SUBJECTIVE AND OBJECTIVE BOX
Patient is a 34y old  Male who presents with a chief complaint of Covid (21 Apr 2020 16:07)      INTERVAL HPI/ OVERNIGHT EVENTS: Pt was seen and examined at bedside today, No significant overnight events, pt continues to have SOB and pleuritic chest and back pain      MEDICATIONS  (STANDING):  enoxaparin Injectable 40 milliGRAM(s) SubCutaneous every 12 hours  methylPREDNISolone sodium succinate Injectable 40 milliGRAM(s) IV Push every 12 hours    MEDICATIONS  (PRN):  acetaminophen   Tablet .. 650 milliGRAM(s) Oral every 4 hours PRN Temp greater or equal to 38.5C (101.3F)  acetaminophen  Suppository .. 650 milliGRAM(s) Rectal every 4 hours PRN Temp greater or equal to 38.5C (101.3F)  ALBUTerol    90 MICROgram(s) HFA Inhaler 2 Puff(s) Inhalation every 4 hours PRN Shortness of Breath and/or Wheezing  benzonatate 100 milliGRAM(s) Oral three times a day PRN Cough  guaifenesin/dextromethorphan  Syrup 10 milliLiter(s) Oral every 4 hours PRN Cough  hydrocodone/homatropine Syrup 5 milliLiter(s) Oral two times a day PRN Cough  ondansetron Injectable 4 milliGRAM(s) IV Push every 6 hours PRN Nausea and/or Vomiting  oxycodone    5 mG/acetaminophen 325 mG 2 Tablet(s) Oral every 6 hours PRN Moderate Pain (4 - 6)      Allergies    No Known Allergies    Intolerances        REVIEW OF SYSTEMS:    Unable to examine due to [ ] Encephalopathy [ ] Advanced Dementia [ ] Expressive Aphasia [ ] Non-verbal patient    CONSTITUTIONAL: No fever, NO generalized weakness/Fatigue, No weight loss  EYES: No eye pain, visual disturbances, or discharge  ENMT:  No difficulty hearing, tinnitus, vertigo; No sinus or throat pain  NECK: No pain or stiffness  RESPIRATORY: positive shortness of breath,  and cough, no wheezing, sputum or hemoptysis   CARDIOVASCULAR: No chest pain, palpitations, or leg swelling  GASTROINTESTINAL: No abdominal pain. No nausea, vomiting, diarrhea or constipation. No melena or hematochezia.  GENITOURINARY: No dysuria, frequency, hematuria, or incontinence  NEUROLOGICAL: No headaches, Dizziness, memory loss, loss of strength, numbness, or tremors  SKIN: No itching, burning, rashes, or lesions   MUSCULOSKELETAL: pleuritic chest/back pain   PSYCHIATRIC: No depression, anxiety, mood swings, or difficulty sleeping  HEME/LYMPH: No easy bruising, or bleeding gums      Vital Signs Last 24 Hrs  T(C): 36.5 (22 Apr 2020 11:25), Max: 36.8 (21 Apr 2020 18:03)  T(F): 97.7 (22 Apr 2020 11:25), Max: 98.3 (21 Apr 2020 18:03)  HR: 71 (22 Apr 2020 11:25) (71 - 85)  BP: 117/57 (22 Apr 2020 11:25) (114/71 - 120/70)  BP(mean): --  RR: 18 (22 Apr 2020 11:25) (16 - 18)  SpO2: 97% (22 Apr 2020 11:25) (91% - 97%)    PHYSICAL EXAM:  GENERAL: NAD, well-developed, well-groomed  HEAD:  Atraumatic, Normocephalic  EYES: conjunctiva and sclera clear  ENMT: Moist mucous membranes  NECK: Supple, No JVD, Normal thyroid  CHEST/LUNG: Clear to Auscultation bilaterally; No rales, rhonchi, wheezing, or rubs  HEART: Regular rate and rhythm; No murmurs, rubs, or gallops  ABDOMEN: Soft, Nontender, Nondistended; Bowel sounds present  EXTREMITIES:  2+ Peripheral Pulses, No clubbing, cyanosis, or edema  SKIN: No rashes or lesions  NERVOUS SYSTEM:  Alert & Oriented X3, Good concentration; Motor Strength 5/5 B/L upper and lower extremities    LABS:                        12.9   15.84 )-----------( 484      ( 22 Apr 2020 07:51 )             39.3     04-22    137  |  103  |  22  ----------------------------<  137<H>  4.4   |  27  |  1.00    Ca    8.5      22 Apr 2020 07:51  Phos  2.9     04-22  Mg     2.6     04-22    TPro  6.5  /  Alb  2.5<L>  /  TBili  0.2  /  DBili  x   /  AST  17  /  ALT  40  /  AlkPhos  51  04-22        CAPILLARY BLOOD GLUCOSE              RADIOLOGY & ADDITIONAL TESTS:          Imaging Personally Reviewed:  [ ] YES  [ ] NO    Consultant(s) Notes Reviewed:  [ ] YES  [ ] NO    Care Discussed with Consultants/Other Providers [x ] YES  [ ] NO

## 2020-04-22 NOTE — DIETITIAN INITIAL EVALUATION ADULT. - ENERGY NEEDS
Height (cm): 180.34 (04-16)  Weight (kg): 104.3 (04-16)  BMI (kg/m2): 32.1 (04-16)  IBW:  78 kg        % IBW:  133%      UBW:  ?          %UBW: ?

## 2020-04-22 NOTE — DIETITIAN INITIAL EVALUATION ADULT. - OTHER INFO
RD attempted to call pt via phone, w/o answer, depressed oral intake PTA & since adm noted, pt appears to have improved oral intake @ present as per documentation.

## 2020-04-22 NOTE — PROGRESS NOTE ADULT - ASSESSMENT
35 y/o -American with COVID  19 still requiring 100 percent non rebreather but makers of inflammation slowly getting better.  Guarded prognosis  please call Wife Nancy at [phone] with updates

## 2020-04-22 NOTE — DIETITIAN INITIAL EVALUATION ADULT. - PERTINENT MEDS FT
MEDICATIONS  (STANDING):  enoxaparin Injectable 40 milliGRAM(s) SubCutaneous every 12 hours  methylPREDNISolone sodium succinate Injectable 40 milliGRAM(s) IV Push every 12 hours    MEDICATIONS  (PRN):  acetaminophen   Tablet .. 650 milliGRAM(s) Oral every 4 hours PRN Temp greater or equal to 38.5C (101.3F)  acetaminophen  Suppository .. 650 milliGRAM(s) Rectal every 4 hours PRN Temp greater or equal to 38.5C (101.3F)  ALBUTerol    90 MICROgram(s) HFA Inhaler 2 Puff(s) Inhalation every 4 hours PRN Shortness of Breath and/or Wheezing  benzonatate 100 milliGRAM(s) Oral three times a day PRN Cough  guaifenesin/dextromethorphan  Syrup 10 milliLiter(s) Oral every 4 hours PRN Cough  hydrocodone/homatropine Syrup 5 milliLiter(s) Oral two times a day PRN Cough  ondansetron Injectable 4 milliGRAM(s) IV Push every 6 hours PRN Nausea and/or Vomiting  oxycodone    5 mG/acetaminophen 325 mG 2 Tablet(s) Oral every 6 hours PRN Moderate Pain (4 - 6)

## 2020-04-23 PROCEDURE — 99233 SBSQ HOSP IP/OBS HIGH 50: CPT

## 2020-04-23 RX ADMIN — Medication 100 MILLIGRAM(S): at 08:03

## 2020-04-23 RX ADMIN — Medication 40 MILLIGRAM(S): at 21:23

## 2020-04-23 RX ADMIN — ALBUTEROL 2 PUFF(S): 90 AEROSOL, METERED ORAL at 15:23

## 2020-04-23 RX ADMIN — Medication 10 MILLILITER(S): at 21:23

## 2020-04-23 RX ADMIN — ENOXAPARIN SODIUM 40 MILLIGRAM(S): 100 INJECTION SUBCUTANEOUS at 06:26

## 2020-04-23 RX ADMIN — Medication 10 MILLILITER(S): at 15:20

## 2020-04-23 RX ADMIN — OXYCODONE AND ACETAMINOPHEN 2 TABLET(S): 5; 325 TABLET ORAL at 06:26

## 2020-04-23 RX ADMIN — ENOXAPARIN SODIUM 40 MILLIGRAM(S): 100 INJECTION SUBCUTANEOUS at 16:40

## 2020-04-23 RX ADMIN — Medication 650 MILLIGRAM(S): at 15:21

## 2020-04-23 RX ADMIN — OXYCODONE AND ACETAMINOPHEN 2 TABLET(S): 5; 325 TABLET ORAL at 21:23

## 2020-04-23 RX ADMIN — Medication 100 MILLIGRAM(S): at 15:23

## 2020-04-23 RX ADMIN — Medication 100 MILLIGRAM(S): at 00:28

## 2020-04-23 RX ADMIN — Medication 40 MILLIGRAM(S): at 10:42

## 2020-04-23 NOTE — PROGRESS NOTE ADULT - ASSESSMENT
33 y/o -American with COVID  19 still requiring 100 percent non rebreather but makers of inflammation slowly getting better.  Guarded prognosis  please call Wife Nancy at [phone] with updates

## 2020-04-23 NOTE — PROGRESS NOTE ADULT - SUBJECTIVE AND OBJECTIVE BOX
Patient is a 34y old  Male who presents with a chief complaint of Covid (22 Apr 2020 17:28)      INTERVAL HPI/ OVERNIGHT EVENTS: Pt was seen and examined at bedside today, No significant overnight events, pt cont to have cough and SOB no significant improvement      MEDICATIONS  (STANDING):  enoxaparin Injectable 40 milliGRAM(s) SubCutaneous every 12 hours  methylPREDNISolone sodium succinate Injectable 40 milliGRAM(s) IV Push every 12 hours    MEDICATIONS  (PRN):  acetaminophen   Tablet .. 650 milliGRAM(s) Oral every 4 hours PRN Temp greater or equal to 38.5C (101.3F)  acetaminophen   Tablet .. 650 milliGRAM(s) Oral every 6 hours PRN Mild Pain (1 - 3)  acetaminophen  Suppository .. 650 milliGRAM(s) Rectal every 4 hours PRN Temp greater or equal to 38.5C (101.3F)  ALBUTerol    90 MICROgram(s) HFA Inhaler 2 Puff(s) Inhalation every 4 hours PRN Shortness of Breath and/or Wheezing  benzonatate 100 milliGRAM(s) Oral three times a day PRN Cough  guaifenesin/dextromethorphan  Syrup 10 milliLiter(s) Oral every 4 hours PRN Cough  hydrocodone/homatropine Syrup 5 milliLiter(s) Oral two times a day PRN Cough  ondansetron Injectable 4 milliGRAM(s) IV Push every 6 hours PRN Nausea and/or Vomiting  oxycodone    5 mG/acetaminophen 325 mG 2 Tablet(s) Oral every 6 hours PRN Moderate Pain (4 - 6)      Allergies    No Known Allergies    Intolerances        REVIEW OF SYSTEMS:    Unable to examine due to [ ] Encephalopathy [ ] Advanced Dementia [ ] Expressive Aphasia [ ] Non-verbal patient    CONSTITUTIONAL: No fever, NO generalized weakness/Fatigue, No weight loss  EYES: No eye pain, visual disturbances, or discharge  ENMT:  No difficulty hearing, tinnitus, vertigo; No sinus or throat pain  NECK: No pain or stiffness  RESPIRATORY: positive shortness of breath,  and cough, no wheezing, sputum or hemoptysis   CARDIOVASCULAR: No chest pain, palpitations, or leg swelling  GASTROINTESTINAL: No abdominal pain. No nausea, vomiting, diarrhea or constipation. No melena or hematochezia.  GENITOURINARY: No dysuria, frequency, hematuria, or incontinence  NEUROLOGICAL: No headaches, Dizziness, memory loss, loss of strength, numbness, or tremors  SKIN: No itching, burning, rashes, or lesions   MUSCULOSKELETAL: pleuritic chest/back pain   PSYCHIATRIC: No depression, anxiety, mood swings, or difficulty sleeping  HEME/LYMPH: No easy bruising, or bleeding gums      Vital Signs Last 24 Hrs  T(C): 36.9 (23 Apr 2020 11:22), Max: 36.9 (23 Apr 2020 11:22)  T(F): 98.5 (23 Apr 2020 11:22), Max: 98.5 (23 Apr 2020 11:22)  HR: 95 (23 Apr 2020 11:22) (74 - 95)  BP: 108/64 (23 Apr 2020 11:22) (108/64 - 114/70)  BP(mean): --  RR: 16 (23 Apr 2020 11:22) (16 - 20)  SpO2: 93% (23 Apr 2020 11:22) (93% - 95%)    PHYSICAL EXAM:  GENERAL: NAD, well-developed, well-groomed, NRB mask  HEAD:  Atraumatic, Normocephalic  EYES: conjunctiva and sclera clear  ENMT: Moist mucous membranes  NECK: Supple, No JVD, Normal thyroid  CHEST/LUNG: Clear to Auscultation bilaterally; No rales, rhonchi, wheezing, or rubs  HEART: Regular rate and rhythm; No murmurs, rubs, or gallops  ABDOMEN: Soft, Nontender, Nondistended; Bowel sounds present  EXTREMITIES:  2+ Peripheral Pulses, No clubbing, cyanosis, or edema  SKIN: No rashes or lesions  NERVOUS SYSTEM:  Alert & Oriented X3, Good concentration; Motor Strength 5/5 B/L upper and lower extremities      LABS:                        12.9   15.84 )-----------( 484      ( 22 Apr 2020 07:51 )             39.3     04-22    137  |  103  |  22  ----------------------------<  137<H>  4.4   |  27  |  1.00    Ca    8.5      22 Apr 2020 07:51  Phos  2.9     04-22  Mg     2.6     04-22    TPro  6.5  /  Alb  2.5<L>  /  TBili  0.2  /  DBili  x   /  AST  17  /  ALT  40  /  AlkPhos  51  04-22        CAPILLARY BLOOD GLUCOSE              RADIOLOGY & ADDITIONAL TESTS:          Imaging Personally Reviewed:  [ ] YES  [ ] NO    Consultant(s) Notes Reviewed:  [ ] YES  [ ] NO    Care Discussed with Consultants/Other Providers [x ] YES  [ ] NO

## 2020-04-24 PROCEDURE — 99233 SBSQ HOSP IP/OBS HIGH 50: CPT

## 2020-04-24 RX ADMIN — ENOXAPARIN SODIUM 40 MILLIGRAM(S): 100 INJECTION SUBCUTANEOUS at 06:14

## 2020-04-24 RX ADMIN — Medication 100 MILLIGRAM(S): at 22:30

## 2020-04-24 RX ADMIN — Medication 10 MILLILITER(S): at 11:48

## 2020-04-24 RX ADMIN — OXYCODONE AND ACETAMINOPHEN 2 TABLET(S): 5; 325 TABLET ORAL at 17:31

## 2020-04-24 RX ADMIN — ENOXAPARIN SODIUM 40 MILLIGRAM(S): 100 INJECTION SUBCUTANEOUS at 17:12

## 2020-04-24 RX ADMIN — Medication 40 MILLIGRAM(S): at 08:27

## 2020-04-24 RX ADMIN — OXYCODONE AND ACETAMINOPHEN 2 TABLET(S): 5; 325 TABLET ORAL at 07:32

## 2020-04-24 NOTE — PROGRESS NOTE ADULT - ASSESSMENT
33 y/o -American with COVID  19 still requiring 100 percent non rebreather but makers of inflammation slowly getting better.  Guarded prognosis  please call Wife Nancy at [phone] with updates

## 2020-04-24 NOTE — PROGRESS NOTE ADULT - SUBJECTIVE AND OBJECTIVE BOX
Patient is a 34y old  Male who presents with a chief complaint of Covid (23 Apr 2020 16:48)      INTERVAL HPI/ OVERNIGHT EVENTS: Pt was seen and examined at bedside today, successfully titrated from NRB mask to Nasal canula, pt admits to breathing better, continues to have Cough and SOB.      MEDICATIONS  (STANDING):  enoxaparin Injectable 40 milliGRAM(s) SubCutaneous every 12 hours    MEDICATIONS  (PRN):  acetaminophen   Tablet .. 650 milliGRAM(s) Oral every 4 hours PRN Temp greater or equal to 38.5C (101.3F)  acetaminophen   Tablet .. 650 milliGRAM(s) Oral every 6 hours PRN Mild Pain (1 - 3)  acetaminophen  Suppository .. 650 milliGRAM(s) Rectal every 4 hours PRN Temp greater or equal to 38.5C (101.3F)  ALBUTerol    90 MICROgram(s) HFA Inhaler 2 Puff(s) Inhalation every 4 hours PRN Shortness of Breath and/or Wheezing  benzonatate 100 milliGRAM(s) Oral three times a day PRN Cough  guaifenesin/dextromethorphan  Syrup 10 milliLiter(s) Oral every 4 hours PRN Cough  hydrocodone/homatropine Syrup 5 milliLiter(s) Oral two times a day PRN Cough  ondansetron Injectable 4 milliGRAM(s) IV Push every 6 hours PRN Nausea and/or Vomiting  oxycodone    5 mG/acetaminophen 325 mG 2 Tablet(s) Oral every 6 hours PRN Moderate Pain (4 - 6)      Allergies    No Known Allergies    Intolerances        REVIEW OF SYSTEMS:    Unable to examine due to [ ] Encephalopathy [ ] Advanced Dementia [ ] Expressive Aphasia [ ] Non-verbal patient    CONSTITUTIONAL: No fever, NO generalized weakness/Fatigue, No weight loss  EYES: No eye pain, visual disturbances, or discharge  ENMT:  No difficulty hearing, tinnitus, vertigo; No sinus or throat pain  NECK: No pain or stiffness  RESPIRATORY: positive shortness of breath,  and cough, no wheezing, sputum or hemoptysis   CARDIOVASCULAR: No chest pain, palpitations, or leg swelling  GASTROINTESTINAL: No abdominal pain. No nausea, vomiting, diarrhea or constipation. No melena or hematochezia.  GENITOURINARY: No dysuria, frequency, hematuria, or incontinence  NEUROLOGICAL: No headaches, Dizziness, memory loss, loss of strength, numbness, or tremors  SKIN: No itching, burning, rashes, or lesions   MUSCULOSKELETAL: pleuritic chest/back pain   PSYCHIATRIC: No depression, anxiety, mood swings, or difficulty sleeping  HEME/LYMPH: No easy bruising, or bleeding gums      Vital Signs Last 24 Hrs  T(C): 36.7 (24 Apr 2020 12:20), Max: 37.1 (23 Apr 2020 17:39)  T(F): 98 (24 Apr 2020 12:20), Max: 98.7 (23 Apr 2020 17:39)  HR: 79 (24 Apr 2020 12:20) (79 - 93)  BP: 123/75 (24 Apr 2020 12:20) (108/66 - 123/75)  BP(mean): --  RR: 18 (24 Apr 2020 12:20) (16 - 18)  SpO2: 96% (24 Apr 2020 12:20) (93% - 98%)    PHYSICAL EXAM:  GENERAL: NAD, well-developed, well-groomed  HEAD:  Atraumatic, Normocephalic  EYES: conjunctiva and sclera clear  ENMT: Moist mucous membranes  NECK: Supple, No JVD, Normal thyroid  CHEST/LUNG: Clear to Auscultation bilaterally; No rales, rhonchi, wheezing, or rubs  HEART: Regular rate and rhythm; No murmurs, rubs, or gallops  ABDOMEN: Soft, Nontender, Nondistended; Bowel sounds present  EXTREMITIES:  2+ Peripheral Pulses, No clubbing, cyanosis, or edema  SKIN: No rashes or lesions  NERVOUS SYSTEM:  Alert & Oriented X3, Good concentration; Motor Strength 5/5 B/L upper and lower extremities        LABS:              CAPILLARY BLOOD GLUCOSE              RADIOLOGY & ADDITIONAL TESTS:          Imaging Personally Reviewed:  [ ] YES  [ ] NO    Consultant(s) Notes Reviewed:  [ ] YES  [ ] NO    Care Discussed with Consultants/Other Providers [x ] YES  [ ] NO

## 2020-04-25 LAB
ANION GAP SERPL CALC-SCNC: 3 MMOL/L — LOW (ref 5–17)
BUN SERPL-MCNC: 22 MG/DL — SIGNIFICANT CHANGE UP (ref 7–23)
CALCIUM SERPL-MCNC: 8.2 MG/DL — LOW (ref 8.5–10.1)
CHLORIDE SERPL-SCNC: 103 MMOL/L — SIGNIFICANT CHANGE UP (ref 96–108)
CO2 SERPL-SCNC: 31 MMOL/L — SIGNIFICANT CHANGE UP (ref 22–31)
CREAT SERPL-MCNC: 1.05 MG/DL — SIGNIFICANT CHANGE UP (ref 0.5–1.3)
CRP SERPL-MCNC: 0.13 MG/DL — SIGNIFICANT CHANGE UP (ref 0–0.4)
D DIMER BLD IA.RAPID-MCNC: 1024 NG/ML DDU — HIGH
FERRITIN SERPL-MCNC: 446 NG/ML — HIGH (ref 30–400)
GLUCOSE SERPL-MCNC: 93 MG/DL — SIGNIFICANT CHANGE UP (ref 70–99)
HCT VFR BLD CALC: 40.7 % — SIGNIFICANT CHANGE UP (ref 39–50)
HGB BLD-MCNC: 12.9 G/DL — LOW (ref 13–17)
LDH SERPL L TO P-CCNC: 513 U/L — HIGH (ref 50–242)
MCHC RBC-ENTMCNC: 28.4 PG — SIGNIFICANT CHANGE UP (ref 27–34)
MCHC RBC-ENTMCNC: 31.7 GM/DL — LOW (ref 32–36)
MCV RBC AUTO: 89.6 FL — SIGNIFICANT CHANGE UP (ref 80–100)
NRBC # BLD: 0 /100 WBCS — SIGNIFICANT CHANGE UP (ref 0–0)
PLATELET # BLD AUTO: 443 K/UL — HIGH (ref 150–400)
POTASSIUM SERPL-MCNC: 4 MMOL/L — SIGNIFICANT CHANGE UP (ref 3.5–5.3)
POTASSIUM SERPL-SCNC: 4 MMOL/L — SIGNIFICANT CHANGE UP (ref 3.5–5.3)
RBC # BLD: 4.54 M/UL — SIGNIFICANT CHANGE UP (ref 4.2–5.8)
RBC # FLD: 13.8 % — SIGNIFICANT CHANGE UP (ref 10.3–14.5)
SODIUM SERPL-SCNC: 137 MMOL/L — SIGNIFICANT CHANGE UP (ref 135–145)
WBC # BLD: 10.44 K/UL — SIGNIFICANT CHANGE UP (ref 3.8–10.5)
WBC # FLD AUTO: 10.44 K/UL — SIGNIFICANT CHANGE UP (ref 3.8–10.5)

## 2020-04-25 PROCEDURE — 99233 SBSQ HOSP IP/OBS HIGH 50: CPT

## 2020-04-25 RX ORDER — ALPRAZOLAM 0.25 MG
0.5 TABLET ORAL ONCE
Refills: 0 | Status: DISCONTINUED | OUTPATIENT
Start: 2020-04-25 | End: 2020-04-25

## 2020-04-25 RX ADMIN — OXYCODONE AND ACETAMINOPHEN 2 TABLET(S): 5; 325 TABLET ORAL at 17:58

## 2020-04-25 RX ADMIN — Medication 100 MILLIGRAM(S): at 08:48

## 2020-04-25 RX ADMIN — OXYCODONE AND ACETAMINOPHEN 2 TABLET(S): 5; 325 TABLET ORAL at 08:42

## 2020-04-25 RX ADMIN — Medication 0.5 MILLIGRAM(S): at 14:06

## 2020-04-25 RX ADMIN — Medication 10 MILLILITER(S): at 18:13

## 2020-04-25 RX ADMIN — Medication 10 MILLILITER(S): at 01:21

## 2020-04-25 RX ADMIN — ENOXAPARIN SODIUM 40 MILLIGRAM(S): 100 INJECTION SUBCUTANEOUS at 16:59

## 2020-04-25 RX ADMIN — Medication 10 MILLILITER(S): at 08:49

## 2020-04-25 RX ADMIN — OXYCODONE AND ACETAMINOPHEN 2 TABLET(S): 5; 325 TABLET ORAL at 01:27

## 2020-04-25 RX ADMIN — ENOXAPARIN SODIUM 40 MILLIGRAM(S): 100 INJECTION SUBCUTANEOUS at 05:01

## 2020-04-25 NOTE — PROGRESS NOTE ADULT - SUBJECTIVE AND OBJECTIVE BOX
Patient is a 34y old  Male who presents with a chief complaint of Covid (24 Apr 2020 14:54)      INTERVAL HPI/ OVERNIGHT EVENTS: Pt was seen and examined at bedside today, No significant overnight events     MEDICATIONS  (STANDING):  enoxaparin Injectable 40 milliGRAM(s) SubCutaneous every 12 hours    MEDICATIONS  (PRN):  acetaminophen   Tablet .. 650 milliGRAM(s) Oral every 4 hours PRN Temp greater or equal to 38.5C (101.3F)  acetaminophen   Tablet .. 650 milliGRAM(s) Oral every 6 hours PRN Mild Pain (1 - 3)  acetaminophen  Suppository .. 650 milliGRAM(s) Rectal every 4 hours PRN Temp greater or equal to 38.5C (101.3F)  ALBUTerol    90 MICROgram(s) HFA Inhaler 2 Puff(s) Inhalation every 4 hours PRN Shortness of Breath and/or Wheezing  benzonatate 100 milliGRAM(s) Oral three times a day PRN Cough  guaifenesin/dextromethorphan  Syrup 10 milliLiter(s) Oral every 4 hours PRN Cough  ondansetron Injectable 4 milliGRAM(s) IV Push every 6 hours PRN Nausea and/or Vomiting  oxycodone    5 mG/acetaminophen 325 mG 2 Tablet(s) Oral every 6 hours PRN Moderate Pain (4 - 6)      Allergies    No Known Allergies    Intolerances        REVIEW OF SYSTEMS:    Unable to examine due to [ ] Encephalopathy [ ] Advanced Dementia [ ] Expressive Aphasia [ ] Non-verbal patient    CONSTITUTIONAL: No fever, NO generalized weakness/Fatigue, No weight loss  EYES: No eye pain, visual disturbances, or discharge  ENMT:  No difficulty hearing, tinnitus, vertigo; No sinus or throat pain  NECK: No pain or stiffness  RESPIRATORY: positive shortness of breath,  and cough, no wheezing, sputum or hemoptysis   CARDIOVASCULAR: No chest pain, palpitations, or leg swelling  GASTROINTESTINAL: No abdominal pain. No nausea, vomiting, diarrhea or constipation. No melena or hematochezia.  GENITOURINARY: No dysuria, frequency, hematuria, or incontinence  NEUROLOGICAL: No headaches, Dizziness, memory loss, loss of strength, numbness, or tremors  SKIN: No itching, burning, rashes, or lesions   MUSCULOSKELETAL: pleuritic chest/back pain   PSYCHIATRIC: No depression, anxiety, mood swings, or difficulty sleeping  HEME/LYMPH: No easy bruising, or bleeding gums      Vital Signs Last 24 Hrs  T(C): 36.7 (25 Apr 2020 05:32), Max: 37 (24 Apr 2020 18:11)  T(F): 98.1 (25 Apr 2020 05:32), Max: 98.6 (24 Apr 2020 18:11)  HR: 93 (25 Apr 2020 05:32) (79 - 99)  BP: 105/54 (25 Apr 2020 05:32) (105/54 - 124/69)  BP(mean): --  RR: 18 (25 Apr 2020 05:32) (18 - 18)  SpO2: 92% (25 Apr 2020 05:32) (92% - 97%)    PHYSICAL EXAM:  GENERAL: NAD, well-developed, well-groomed  HEAD:  Atraumatic, Normocephalic  EYES: conjunctiva and sclera clear  ENMT: Moist mucous membranes  NECK: Supple, No JVD, Normal thyroid  CHEST/LUNG: Clear to Auscultation bilaterally; No rales, rhonchi, wheezing, or rubs  HEART: Regular rate and rhythm; No murmurs, rubs, or gallops  ABDOMEN: Soft, Nontender, Nondistended; Bowel sounds present  EXTREMITIES:  2+ Peripheral Pulses, No clubbing, cyanosis, or edema  SKIN: No rashes or lesions  NERVOUS SYSTEM:  Alert & Oriented X3, Good concentration; Motor Strength 5/5 B/L upper and lower extremities    LABS:                        12.9   10.44 )-----------( 443      ( 25 Apr 2020 07:23 )             40.7     04-25    137  |  103  |  22  ----------------------------<  93  4.0   |  31  |  1.05    Ca    8.2<L>      25 Apr 2020 07:23          CAPILLARY BLOOD GLUCOSE              RADIOLOGY & ADDITIONAL TESTS:          Imaging Personally Reviewed:  [ ] YES  [ ] NO    Consultant(s) Notes Reviewed:  [ ] YES  [ ] NO    Care Discussed with Consultants/Other Providers [x ] YES  [ ] NO Patient is a 34y old  Male who presents with a chief complaint of Covid (24 Apr 2020 14:54)      INTERVAL HPI/ OVERNIGHT EVENTS: Pt was seen and examined at bedside today, No significant overnight events, breathing continues to improve      MEDICATIONS  (STANDING):  enoxaparin Injectable 40 milliGRAM(s) SubCutaneous every 12 hours    MEDICATIONS  (PRN):  acetaminophen   Tablet .. 650 milliGRAM(s) Oral every 4 hours PRN Temp greater or equal to 38.5C (101.3F)  acetaminophen   Tablet .. 650 milliGRAM(s) Oral every 6 hours PRN Mild Pain (1 - 3)  acetaminophen  Suppository .. 650 milliGRAM(s) Rectal every 4 hours PRN Temp greater or equal to 38.5C (101.3F)  ALBUTerol    90 MICROgram(s) HFA Inhaler 2 Puff(s) Inhalation every 4 hours PRN Shortness of Breath and/or Wheezing  benzonatate 100 milliGRAM(s) Oral three times a day PRN Cough  guaifenesin/dextromethorphan  Syrup 10 milliLiter(s) Oral every 4 hours PRN Cough  ondansetron Injectable 4 milliGRAM(s) IV Push every 6 hours PRN Nausea and/or Vomiting  oxycodone    5 mG/acetaminophen 325 mG 2 Tablet(s) Oral every 6 hours PRN Moderate Pain (4 - 6)      Allergies    No Known Allergies    Intolerances        REVIEW OF SYSTEMS:    Unable to examine due to [ ] Encephalopathy [ ] Advanced Dementia [ ] Expressive Aphasia [ ] Non-verbal patient    CONSTITUTIONAL: No fever, NO generalized weakness/Fatigue, No weight loss  EYES: No eye pain, visual disturbances, or discharge  ENMT:  No difficulty hearing, tinnitus, vertigo; No sinus or throat pain  NECK: No pain or stiffness  RESPIRATORY: positive shortness of breath,  and cough, no wheezing, sputum or hemoptysis   CARDIOVASCULAR: No chest pain, palpitations, or leg swelling  GASTROINTESTINAL: No abdominal pain. No nausea, vomiting, diarrhea or constipation. No melena or hematochezia.  GENITOURINARY: No dysuria, frequency, hematuria, or incontinence  NEUROLOGICAL: No headaches, Dizziness, memory loss, loss of strength, numbness, or tremors  SKIN: No itching, burning, rashes, or lesions   MUSCULOSKELETAL: pleuritic chest/back pain   PSYCHIATRIC: No depression, anxiety, mood swings, or difficulty sleeping  HEME/LYMPH: No easy bruising, or bleeding gums      Vital Signs Last 24 Hrs  T(C): 36.7 (25 Apr 2020 05:32), Max: 37 (24 Apr 2020 18:11)  T(F): 98.1 (25 Apr 2020 05:32), Max: 98.6 (24 Apr 2020 18:11)  HR: 93 (25 Apr 2020 05:32) (79 - 99)  BP: 105/54 (25 Apr 2020 05:32) (105/54 - 124/69)  BP(mean): --  RR: 18 (25 Apr 2020 05:32) (18 - 18)  SpO2: 92% (25 Apr 2020 05:32) (92% - 97%)    PHYSICAL EXAM:  GENERAL: NAD, well-developed, well-groomed  HEAD:  Atraumatic, Normocephalic  EYES: conjunctiva and sclera clear  ENMT: Moist mucous membranes  NECK: Supple, No JVD, Normal thyroid  CHEST/LUNG: Clear to Auscultation bilaterally; No rales, rhonchi, wheezing, or rubs  HEART: Regular rate and rhythm; No murmurs, rubs, or gallops  ABDOMEN: Soft, Nontender, Nondistended; Bowel sounds present  EXTREMITIES:  2+ Peripheral Pulses, No clubbing, cyanosis, or edema  SKIN: No rashes or lesions  NERVOUS SYSTEM:  Alert & Oriented X3, Good concentration; Motor Strength 5/5 B/L upper and lower extremities    LABS:                        12.9   10.44 )-----------( 443      ( 25 Apr 2020 07:23 )             40.7     04-25    137  |  103  |  22  ----------------------------<  93  4.0   |  31  |  1.05    Ca    8.2<L>      25 Apr 2020 07:23          CAPILLARY BLOOD GLUCOSE              RADIOLOGY & ADDITIONAL TESTS:          Imaging Personally Reviewed:  [ ] YES  [ ] NO    Consultant(s) Notes Reviewed:  [ ] YES  [ ] NO    Care Discussed with Consultants/Other Providers [x ] YES  [ ] NO

## 2020-04-25 NOTE — PROGRESS NOTE ADULT - ASSESSMENT
33 y/o -American with COVID  19 still requiring 100 percent non rebreather but makers of inflammation slowly getting better.  Guarded prognosis  please call Wife Nancy at [phone] with updates

## 2020-04-26 LAB
ALBUMIN SERPL ELPH-MCNC: 2.4 G/DL — LOW (ref 3.3–5)
ALP SERPL-CCNC: 49 U/L — SIGNIFICANT CHANGE UP (ref 40–120)
ALT FLD-CCNC: 86 U/L — HIGH (ref 12–78)
ANION GAP SERPL CALC-SCNC: 6 MMOL/L — SIGNIFICANT CHANGE UP (ref 5–17)
AST SERPL-CCNC: 35 U/L — SIGNIFICANT CHANGE UP (ref 15–37)
BASOPHILS # BLD AUTO: 0.01 K/UL — SIGNIFICANT CHANGE UP (ref 0–0.2)
BASOPHILS NFR BLD AUTO: 0.2 % — SIGNIFICANT CHANGE UP (ref 0–2)
BILIRUB SERPL-MCNC: 0.4 MG/DL — SIGNIFICANT CHANGE UP (ref 0.2–1.2)
BUN SERPL-MCNC: 20 MG/DL — SIGNIFICANT CHANGE UP (ref 7–23)
CALCIUM SERPL-MCNC: 8.2 MG/DL — LOW (ref 8.5–10.1)
CHLORIDE SERPL-SCNC: 101 MMOL/L — SIGNIFICANT CHANGE UP (ref 96–108)
CO2 SERPL-SCNC: 29 MMOL/L — SIGNIFICANT CHANGE UP (ref 22–31)
CREAT SERPL-MCNC: 1 MG/DL — SIGNIFICANT CHANGE UP (ref 0.5–1.3)
EOSINOPHIL # BLD AUTO: 0.25 K/UL — SIGNIFICANT CHANGE UP (ref 0–0.5)
EOSINOPHIL NFR BLD AUTO: 4.4 % — SIGNIFICANT CHANGE UP (ref 0–6)
GLUCOSE SERPL-MCNC: 87 MG/DL — SIGNIFICANT CHANGE UP (ref 70–99)
HCT VFR BLD CALC: 43.1 % — SIGNIFICANT CHANGE UP (ref 39–50)
HGB BLD-MCNC: 13.8 G/DL — SIGNIFICANT CHANGE UP (ref 13–17)
IMM GRANULOCYTES NFR BLD AUTO: 4 % — HIGH (ref 0–1.5)
LYMPHOCYTES # BLD AUTO: 2.01 K/UL — SIGNIFICANT CHANGE UP (ref 1–3.3)
LYMPHOCYTES # BLD AUTO: 35.3 % — SIGNIFICANT CHANGE UP (ref 13–44)
MAGNESIUM SERPL-MCNC: 2.2 MG/DL — SIGNIFICANT CHANGE UP (ref 1.6–2.6)
MCHC RBC-ENTMCNC: 28.6 PG — SIGNIFICANT CHANGE UP (ref 27–34)
MCHC RBC-ENTMCNC: 32 GM/DL — SIGNIFICANT CHANGE UP (ref 32–36)
MCV RBC AUTO: 89.4 FL — SIGNIFICANT CHANGE UP (ref 80–100)
MONOCYTES # BLD AUTO: 0.51 K/UL — SIGNIFICANT CHANGE UP (ref 0–0.9)
MONOCYTES NFR BLD AUTO: 8.9 % — SIGNIFICANT CHANGE UP (ref 2–14)
NEUTROPHILS # BLD AUTO: 2.69 K/UL — SIGNIFICANT CHANGE UP (ref 1.8–7.4)
NEUTROPHILS NFR BLD AUTO: 47.2 % — SIGNIFICANT CHANGE UP (ref 43–77)
NRBC # BLD: 0 /100 WBCS — SIGNIFICANT CHANGE UP (ref 0–0)
PLATELET # BLD AUTO: 415 K/UL — HIGH (ref 150–400)
POTASSIUM SERPL-MCNC: 4.1 MMOL/L — SIGNIFICANT CHANGE UP (ref 3.5–5.3)
POTASSIUM SERPL-SCNC: 4.1 MMOL/L — SIGNIFICANT CHANGE UP (ref 3.5–5.3)
PROT SERPL-MCNC: 5.7 GM/DL — LOW (ref 6–8.3)
RBC # BLD: 4.82 M/UL — SIGNIFICANT CHANGE UP (ref 4.2–5.8)
RBC # FLD: 14.1 % — SIGNIFICANT CHANGE UP (ref 10.3–14.5)
SODIUM SERPL-SCNC: 136 MMOL/L — SIGNIFICANT CHANGE UP (ref 135–145)
WBC # BLD: 5.7 K/UL — SIGNIFICANT CHANGE UP (ref 3.8–10.5)
WBC # FLD AUTO: 5.7 K/UL — SIGNIFICANT CHANGE UP (ref 3.8–10.5)

## 2020-04-26 PROCEDURE — 99233 SBSQ HOSP IP/OBS HIGH 50: CPT

## 2020-04-26 RX ORDER — OXYCODONE AND ACETAMINOPHEN 5; 325 MG/1; MG/1
1 TABLET ORAL ONCE
Refills: 0 | Status: DISCONTINUED | OUTPATIENT
Start: 2020-04-26 | End: 2020-04-26

## 2020-04-26 RX ORDER — OXYCODONE AND ACETAMINOPHEN 5; 325 MG/1; MG/1
1 TABLET ORAL EVERY 4 HOURS
Refills: 0 | Status: DISCONTINUED | OUTPATIENT
Start: 2020-04-26 | End: 2020-05-03

## 2020-04-26 RX ADMIN — OXYCODONE AND ACETAMINOPHEN 1 TABLET(S): 5; 325 TABLET ORAL at 20:03

## 2020-04-26 RX ADMIN — Medication 650 MILLIGRAM(S): at 05:16

## 2020-04-26 RX ADMIN — Medication 10 MILLILITER(S): at 14:36

## 2020-04-26 RX ADMIN — Medication 10 MILLILITER(S): at 23:42

## 2020-04-26 RX ADMIN — Medication 10 MILLILITER(S): at 02:24

## 2020-04-26 RX ADMIN — ENOXAPARIN SODIUM 40 MILLIGRAM(S): 100 INJECTION SUBCUTANEOUS at 05:15

## 2020-04-26 RX ADMIN — OXYCODONE AND ACETAMINOPHEN 1 TABLET(S): 5; 325 TABLET ORAL at 02:24

## 2020-04-26 RX ADMIN — ENOXAPARIN SODIUM 40 MILLIGRAM(S): 100 INJECTION SUBCUTANEOUS at 17:08

## 2020-04-26 RX ADMIN — OXYCODONE AND ACETAMINOPHEN 1 TABLET(S): 5; 325 TABLET ORAL at 12:26

## 2020-04-26 NOTE — PROGRESS NOTE ADULT - SUBJECTIVE AND OBJECTIVE BOX
Patient is a 34y old  Male who presents with a chief complaint of Covid (25 Apr 2020 08:37)      INTERVAL HPI/ OVERNIGHT EVENTS: Pt was seen and examined at bedside today, No significant overnight events, pt was back on NRB this morning but is feeling better now on nasal canula, admits to feeling better now with proning.      MEDICATIONS  (STANDING):  enoxaparin Injectable 40 milliGRAM(s) SubCutaneous every 12 hours    MEDICATIONS  (PRN):  acetaminophen   Tablet .. 650 milliGRAM(s) Oral every 4 hours PRN Temp greater or equal to 38.5C (101.3F)  acetaminophen   Tablet .. 650 milliGRAM(s) Oral every 6 hours PRN Mild Pain (1 - 3)  acetaminophen  Suppository .. 650 milliGRAM(s) Rectal every 4 hours PRN Temp greater or equal to 38.5C (101.3F)  ALBUTerol    90 MICROgram(s) HFA Inhaler 2 Puff(s) Inhalation every 4 hours PRN Shortness of Breath and/or Wheezing  benzonatate 100 milliGRAM(s) Oral three times a day PRN Cough  guaifenesin/dextromethorphan  Syrup 10 milliLiter(s) Oral every 4 hours PRN Cough  ondansetron Injectable 4 milliGRAM(s) IV Push every 6 hours PRN Nausea and/or Vomiting  oxycodone    5 mG/acetaminophen 325 mG 1 Tablet(s) Oral every 4 hours PRN Severe Pain (7 - 10)      Allergies    No Known Allergies    Intolerances        REVIEW OF SYSTEMS:    Unable to examine due to [ ] Encephalopathy [ ] Advanced Dementia [ ] Expressive Aphasia [ ] Non-verbal patient    CONSTITUTIONAL: No fever, NO generalized weakness/Fatigue, No weight loss  EYES: No eye pain, visual disturbances, or discharge  ENMT:  No difficulty hearing, tinnitus, vertigo; No sinus or throat pain  NECK: No pain or stiffness  RESPIRATORY: positive shortness of breath,  and cough, no wheezing, sputum or hemoptysis   CARDIOVASCULAR: No chest pain, palpitations, or leg swelling  GASTROINTESTINAL: No abdominal pain. No nausea, vomiting, diarrhea or constipation. No melena or hematochezia.  GENITOURINARY: No dysuria, frequency, hematuria, or incontinence  NEUROLOGICAL: No headaches, Dizziness, memory loss, loss of strength, numbness, or tremors  SKIN: No itching, burning, rashes, or lesions   MUSCULOSKELETAL: pleuritic chest/back pain   PSYCHIATRIC: No depression, anxiety, mood swings, or difficulty sleeping  HEME/LYMPH: No easy bruising, or bleeding gums      Vital Signs Last 24 Hrs  T(C): 36.6 (26 Apr 2020 13:02), Max: 36.6 (26 Apr 2020 13:02)  T(F): 97.9 (26 Apr 2020 13:02), Max: 97.9 (26 Apr 2020 13:02)  HR: 75 (26 Apr 2020 13:02) (75 - 90)  BP: 102/62 (26 Apr 2020 13:02) (100/67 - 102/62)  BP(mean): --  RR: 18 (26 Apr 2020 13:02) (18 - 18)  SpO2: 100% (26 Apr 2020 13:02) (99% - 100%)    PHYSICAL EXAM:  GENERAL: NAD, well-developed, well-groomed  HEAD:  Atraumatic, Normocephalic  EYES: conjunctiva and sclera clear  ENMT: Moist mucous membranes  NECK: Supple, No JVD, Normal thyroid  CHEST/LUNG: Clear to Auscultation bilaterally; No rales, rhonchi, wheezing, or rubs  HEART: Regular rate and rhythm; No murmurs, rubs, or gallops  ABDOMEN: Soft, Nontender, Nondistended; Bowel sounds present  EXTREMITIES:  2+ Peripheral Pulses, No clubbing, cyanosis, or edema  SKIN: No rashes or lesions  NERVOUS SYSTEM:  Alert & Oriented X3, Good concentration; Motor Strength 5/5 B/L upper and lower extremities    LABS:                        13.8   5.70  )-----------( 415      ( 26 Apr 2020 08:49 )             43.1     04-26    136  |  101  |  20  ----------------------------<  87  4.1   |  29  |  1.00    Ca    8.2<L>      26 Apr 2020 08:49  Mg     2.2     04-26    TPro  5.7<L>  /  Alb  2.4<L>  /  TBili  0.4  /  DBili  x   /  AST  35  /  ALT  86<H>  /  AlkPhos  49  04-26        CAPILLARY BLOOD GLUCOSE              RADIOLOGY & ADDITIONAL TESTS:          Imaging Personally Reviewed:  [ ] YES  [ ] NO    Consultant(s) Notes Reviewed:  [ ] YES  [ ] NO    Care Discussed with Consultants/Other Providers [x ] YES  [ ] NO

## 2020-04-26 NOTE — PROGRESS NOTE ADULT - ASSESSMENT
35 y/o -American with COVID  19 still requiring 100 percent non rebreather but makers of inflammation slowly getting better.  Guarded prognosis  please call Wife Nancy at [phone] with updates

## 2020-04-27 DIAGNOSIS — R21 RASH AND OTHER NONSPECIFIC SKIN ERUPTION: ICD-10-CM

## 2020-04-27 PROCEDURE — 99233 SBSQ HOSP IP/OBS HIGH 50: CPT

## 2020-04-27 PROCEDURE — 99253 IP/OBS CNSLTJ NEW/EST LOW 45: CPT

## 2020-04-27 RX ORDER — ALPRAZOLAM 0.25 MG
0.5 TABLET ORAL ONCE
Refills: 0 | Status: DISCONTINUED | OUTPATIENT
Start: 2020-04-27 | End: 2020-04-27

## 2020-04-27 RX ORDER — DOCOSANOL 100 MG/G
1 CREAM TOPICAL
Refills: 0 | Status: DISCONTINUED | OUTPATIENT
Start: 2020-04-27 | End: 2020-04-27

## 2020-04-27 RX ADMIN — Medication 100 MILLIGRAM(S): at 12:27

## 2020-04-27 RX ADMIN — Medication 1 TABLET(S): at 16:21

## 2020-04-27 RX ADMIN — Medication 0.5 MILLIGRAM(S): at 01:14

## 2020-04-27 RX ADMIN — Medication 10 MILLILITER(S): at 23:40

## 2020-04-27 RX ADMIN — ENOXAPARIN SODIUM 40 MILLIGRAM(S): 100 INJECTION SUBCUTANEOUS at 05:08

## 2020-04-27 RX ADMIN — Medication 0.5 MILLIGRAM(S): at 23:41

## 2020-04-27 RX ADMIN — OXYCODONE AND ACETAMINOPHEN 1 TABLET(S): 5; 325 TABLET ORAL at 12:29

## 2020-04-27 RX ADMIN — ENOXAPARIN SODIUM 40 MILLIGRAM(S): 100 INJECTION SUBCUTANEOUS at 17:19

## 2020-04-27 RX ADMIN — OXYCODONE AND ACETAMINOPHEN 1 TABLET(S): 5; 325 TABLET ORAL at 22:23

## 2020-04-27 NOTE — CONSULT NOTE ADULT - SUBJECTIVE AND OBJECTIVE BOX
Infectious Diseases - Attending at Dr. Puentes    HPI:  No PMHx +3 days of SOB/cough/congestion, nausea only able to tolerate soup, 2 days ago went to urgent care, at that time had CXR showing pneumonia consistent with COVID --  subsequently started on cefdinir and azithromycin, states that he has progressively worsened instead of improving, has been having high fevers to 103F, used APAP this AM for this approx 2 hrs pta, took 650mg. Denies other new symptoms of concern (16 Apr 2020 13:33)      PAST MEDICAL & SURGICAL HISTORY:  No pertinent past medical history  No significant past surgical history      Allergies    No Known Allergies    Intolerances        FAMILY HISTORY:  No pertinent family history in first degree relatives      SOCIAL HISTORY:    REVIEW OF SYSTEMS:    Constitutional: No fever, weight loss or fatigue  Eyes: No eye pain, visual disturbances, or discharge  ENT:  No difficulty hearing, tinnitus, vertigo; No sinus or throat pain  Neck: No pain or stiffness  Respiratory: No cough, wheezing, chills or hemoptysis  Cardiovascular: No chest pain, palpitations, shortness of breath, dizziness or leg swelling  Gastrointestinal: No abdominal or epigastric pain. No nausea, vomiting or hematemesis; No diarrhea or constipation. No melena or hematochezia.  Genitourinary: No dysuria, frequency, hematuria or incontinence  Neurological: No headaches, memory loss, loss of strength, numbness or tremors  Skin: No itching, burning, rashes or lesions       MEDICATIONS  (STANDING):  amoxicillin  875 milliGRAM(s)/clavulanate 1 Tablet(s) Oral two times a day  enoxaparin Injectable 40 milliGRAM(s) SubCutaneous every 12 hours    MEDICATIONS  (PRN):  acetaminophen   Tablet .. 650 milliGRAM(s) Oral every 4 hours PRN Temp greater or equal to 38.5C (101.3F)  acetaminophen   Tablet .. 650 milliGRAM(s) Oral every 6 hours PRN Mild Pain (1 - 3)  acetaminophen  Suppository .. 650 milliGRAM(s) Rectal every 4 hours PRN Temp greater or equal to 38.5C (101.3F)  ALBUTerol    90 MICROgram(s) HFA Inhaler 2 Puff(s) Inhalation every 4 hours PRN Shortness of Breath and/or Wheezing  benzonatate 100 milliGRAM(s) Oral three times a day PRN Cough  guaifenesin/dextromethorphan  Syrup 10 milliLiter(s) Oral every 4 hours PRN Cough  ondansetron Injectable 4 milliGRAM(s) IV Push every 6 hours PRN Nausea and/or Vomiting  oxycodone    5 mG/acetaminophen 325 mG 1 Tablet(s) Oral every 4 hours PRN Severe Pain (7 - 10)      Vital Signs Last 24 Hrs  T(C): 36.9 (27 Apr 2020 10:44), Max: 37.3 (26 Apr 2020 17:45)  T(F): 98.4 (27 Apr 2020 10:44), Max: 99.2 (26 Apr 2020 17:45)  HR: 96 (27 Apr 2020 10:44) (89 - 104)  BP: 107/68 (27 Apr 2020 10:44) (100/51 - 114/68)  BP(mean): --  RR: 18 (27 Apr 2020 10:44) (16 - 18)  SpO2: 98% (27 Apr 2020 10:44) (96% - 99%)    PHYSICAL EXAM:    Constitutional: NAD, well-groomed, well-developed  HEENT: PERRLA, EOMI, Normal Hearing, MMM  Neck: No LAD, No JVD  Back: Normal spine flexure, No CVA tenderness  Respiratory: CTAB/L  Cardiovascular: S1 and S2, RRR, no M/G/R  Gastrointestinal: BS+, soft, NT/ND  Extremities: No peripheral edema  Vascular: 2+ peripheral pulses  Neurological: A/O x 3, no focal deficits  Skin: No rashes      LABS:                        13.8   5.70  )-----------( 415      ( 26 Apr 2020 08:49 )             43.1     04-26    136  |  101  |  20  ----------------------------<  87  4.1   |  29  |  1.00    Ca    8.2<L>      26 Apr 2020 08:49  Mg     2.2     04-26    TPro  5.7<L>  /  Alb  2.4<L>  /  TBili  0.4  /  DBili  x   /  AST  35  /  ALT  86<H>  /  AlkPhos  49  04-26          MICROBIOLOGY:  RECENT CULTURES:        RADIOLOGY & ADDITIONAL STUDIES: Infectious Diseases - Attending at Dr. Puentes    HPI:  34 yr old male  +3 days of SOB/cough/congestion, nausea only able to tolerate soup, 2 days ago went to urgent care, at that time had CXR showing pneumonia consistent with COVID --  subsequently started on cefdinir and azithromycin, states that he has progressively worsened instead of improving, has been having high fevers to 103F, used APAP this AM for this approx 2 hrs pta, took 650mg. Denies other new symptoms of concern (16 Apr 2020 13:33)  Found to be covid positive ,pt is continuously coughing throughout the interview .was on NRB and now noted ot have pain and crusting of bottom lip    PAST MEDICAL & SURGICAL HISTORY:  No pertinent past medical history  No significant past surgical history      Allergies    No Known Allergies    Intolerances        FAMILY HISTORY:  No DM,HTN in mother ,father      SOCIAL HISTORY: non smoker    REVIEW OF SYSTEMS:    Constitutional: No fever, weight loss or fatigue  Eyes: No eye pain, visual disturbances, or discharge  ENT:  No difficulty hearing, tinnitus, vertigo; No sinus or throat pain  Neck: No pain or stiffness  Respiratory: No cough, wheezing, chills or hemoptysis  Cardiovascular: No chest pain, palpitations, shortness of breath, dizziness or leg swelling  Gastrointestinal: No abdominal or epigastric pain. No nausea, vomiting or hematemesis; No diarrhea or constipation. No melena or hematochezia.  Genitourinary: No dysuria, frequency, hematuria or incontinence  Neurological: No headaches, memory loss, loss of strength, numbness or tremors  Skin: crusting and pain on the lip    MEDICATIONS  (STANDING):  amoxicillin  875 milliGRAM(s)/clavulanate 1 Tablet(s) Oral two times a day  enoxaparin Injectable 40 milliGRAM(s) SubCutaneous every 12 hours    MEDICATIONS  (PRN):  acetaminophen   Tablet .. 650 milliGRAM(s) Oral every 4 hours PRN Temp greater or equal to 38.5C (101.3F)  acetaminophen   Tablet .. 650 milliGRAM(s) Oral every 6 hours PRN Mild Pain (1 - 3)  acetaminophen  Suppository .. 650 milliGRAM(s) Rectal every 4 hours PRN Temp greater or equal to 38.5C (101.3F)  ALBUTerol    90 MICROgram(s) HFA Inhaler 2 Puff(s) Inhalation every 4 hours PRN Shortness of Breath and/or Wheezing  benzonatate 100 milliGRAM(s) Oral three times a day PRN Cough  guaifenesin/dextromethorphan  Syrup 10 milliLiter(s) Oral every 4 hours PRN Cough  ondansetron Injectable 4 milliGRAM(s) IV Push every 6 hours PRN Nausea and/or Vomiting  oxycodone    5 mG/acetaminophen 325 mG 1 Tablet(s) Oral every 4 hours PRN Severe Pain (7 - 10)      Vital Signs Last 24 Hrs  T(C): 36.9 (27 Apr 2020 10:44), Max: 37.3 (26 Apr 2020 17:45)  T(F): 98.4 (27 Apr 2020 10:44), Max: 99.2 (26 Apr 2020 17:45)  HR: 96 (27 Apr 2020 10:44) (89 - 104)  BP: 107/68 (27 Apr 2020 10:44) (100/51 - 114/68)  BP(mean): --  RR: 18 (27 Apr 2020 10:44) (16 - 18)  SpO2: 98% (27 Apr 2020 10:44) (96% - 99%)    PHYSICAL EXAM:    Constitutional: NAD, well-groomed, well-developed  HEENT: PERRLA, EOMI, Normal Hearing, MMM  Neck: No LAD, No JVD  Back: Normal spine flexure, No CVA tenderness  Respiratory: CTAB/L  Cardiovascular: S1 and S2, RRR, no M/G/R  Gastrointestinal: BS+, soft, NT/ND  Extremities: No peripheral edema  Vascular: 2+ peripheral pulses  Neurological: A/O x 3, no focal deficits  Skin :Lower lip crusting+with yellow exudate ,drying up ,tender,no wound with dark scabs etc ,no lesions inside the mouth      LABS:                        13.8   5.70  )-----------( 415      ( 26 Apr 2020 08:49 )             43.1     04-26    136  |  101  |  20  ----------------------------<  87  4.1   |  29  |  1.00    Ca    8.2<L>      26 Apr 2020 08:49  Mg     2.2     04-26    TPro  5.7<L>  /  Alb  2.4<L>  /  TBili  0.4  /  DBili  x   /  AST  35  /  ALT  86<H>  /  AlkPhos  49  04-26        COVID-19 PCR . (04.16.20 @ 11:44)    COVID-19 PCR: Detected: This test has been validated by [employer] to be accurate;  though it has not been FDA cleared/approved by the usual pathway  As with all laboratory test, results should be correlated with clinical  findings.  https://www.fda.gov/media/690581/download  https://www.fda.gov/media/756322/download    Ferritin, Serum in AM (04.25.20 @ 11:42)    Ferritin, Serum: 446 ng/mL      MICROBIOLOGY:  RECENT CULTURES:  < from: Xray Chest 1 View- PORTABLE-Routine (04.20.20 @ 18:18) >  FINDINGS:   The lungs  show slight decrease in the conspicuity of the bilateral RIGHT greater than LEFT perihilar and peripheral lung multifocal ill-defined airspace consolidations .    < end of copied text >        RADIOLOGY & ADDITIONAL STUDIES:

## 2020-04-27 NOTE — PROGRESS NOTE ADULT - ASSESSMENT
35 y/o -American with COVID  19 still requiring 100 percent non rebreather but makers of inflammation slowly getting better.  Guarded prognosis  please call Wife Nancy at [phone] with updates

## 2020-04-27 NOTE — PHYSICAL THERAPY INITIAL EVALUATION ADULT - GAIT TRAINING, PT EVAL
Improve ambulation tolerance and be a community ambulator without use of assistive device or 02 supplement.

## 2020-04-27 NOTE — PROGRESS NOTE ADULT - SUBJECTIVE AND OBJECTIVE BOX
Patient is a 34y old  Male who presents with a chief complaint of Covid (26 Apr 2020 15:14)      INTERVAL HPI/ OVERNIGHT EVENTS: Pt was seen and examined at bedside today, No significant overnight events, pt admits that breathing is better today, admits to feeling Anxious at times. pt c/o burning of left lower lip      MEDICATIONS  (STANDING):  ALPRAZolam 0.5 milliGRAM(s) Oral once  amoxicillin  875 milliGRAM(s)/clavulanate 1 Tablet(s) Oral two times a day  enoxaparin Injectable 40 milliGRAM(s) SubCutaneous every 12 hours    MEDICATIONS  (PRN):  acetaminophen   Tablet .. 650 milliGRAM(s) Oral every 4 hours PRN Temp greater or equal to 38.5C (101.3F)  acetaminophen   Tablet .. 650 milliGRAM(s) Oral every 6 hours PRN Mild Pain (1 - 3)  acetaminophen  Suppository .. 650 milliGRAM(s) Rectal every 4 hours PRN Temp greater or equal to 38.5C (101.3F)  ALBUTerol    90 MICROgram(s) HFA Inhaler 2 Puff(s) Inhalation every 4 hours PRN Shortness of Breath and/or Wheezing  benzonatate 100 milliGRAM(s) Oral three times a day PRN Cough  guaifenesin/dextromethorphan  Syrup 10 milliLiter(s) Oral every 4 hours PRN Cough  ondansetron Injectable 4 milliGRAM(s) IV Push every 6 hours PRN Nausea and/or Vomiting  oxycodone    5 mG/acetaminophen 325 mG 1 Tablet(s) Oral every 4 hours PRN Severe Pain (7 - 10)      Allergies    No Known Allergies    Intolerances        REVIEW OF SYSTEMS:    Unable to examine due to [ ] Encephalopathy [ ] Advanced Dementia [ ] Expressive Aphasia [ ] Non-verbal patient    CONSTITUTIONAL: No fever, NO generalized weakness/Fatigue, No weight loss  EYES: No eye pain, visual disturbances, or discharge  ENMT:  No difficulty hearing, tinnitus, vertigo; No sinus or throat pain  NECK: No pain or stiffness  RESPIRATORY: positive shortness of breath,  and cough, no wheezing, sputum or hemoptysis   CARDIOVASCULAR: No chest pain, palpitations, or leg swelling  GASTROINTESTINAL: No abdominal pain. No nausea, vomiting, diarrhea or constipation. No melena or hematochezia.  GENITOURINARY: No dysuria, frequency, hematuria, or incontinence  NEUROLOGICAL: No headaches, Dizziness, memory loss, loss of strength, numbness, or tremors  SKIN: lip rash   MUSCULOSKELETAL: pleuritic chest/back pain   PSYCHIATRIC: No depression, anxiety, mood swings, or difficulty sleeping  HEME/LYMPH: No easy bruising, or bleeding gums        Vital Signs Last 24 Hrs  T(C): 36.9 (27 Apr 2020 10:44), Max: 37.3 (26 Apr 2020 17:45)  T(F): 98.4 (27 Apr 2020 10:44), Max: 99.2 (26 Apr 2020 17:45)  HR: 96 (27 Apr 2020 10:44) (89 - 104)  BP: 107/68 (27 Apr 2020 10:44) (100/51 - 114/68)  BP(mean): --  RR: 18 (27 Apr 2020 10:44) (16 - 18)  SpO2: 98% (27 Apr 2020 10:44) (96% - 99%)    PHYSICAL EXAM:  GENERAL: NAD, well-developed, well-groomed  HEAD:  Atraumatic, Normocephalic  EYES: conjunctiva and sclera clear  ENMT: Moist mucous membranes  NECK: Supple, No JVD, Normal thyroid  CHEST/LUNG: Clear to Auscultation bilaterally; No rales, rhonchi, wheezing, or rubs  HEART: Regular rate and rhythm; No murmurs, rubs, or gallops  ABDOMEN: Soft, Nontender, Nondistended; Bowel sounds present  EXTREMITIES:  2+ Peripheral Pulses, No clubbing, cyanosis, or edema  SKIN: left lip skin eruption   NERVOUS SYSTEM:  Alert & Oriented X3, Good concentration; Motor Strength 5/5 B/L upper and lower extremities      LABS:                        13.8   5.70  )-----------( 415      ( 26 Apr 2020 08:49 )             43.1     04-26    136  |  101  |  20  ----------------------------<  87  4.1   |  29  |  1.00    Ca    8.2<L>      26 Apr 2020 08:49  Mg     2.2     04-26    TPro  5.7<L>  /  Alb  2.4<L>  /  TBili  0.4  /  DBili  x   /  AST  35  /  ALT  86<H>  /  AlkPhos  49  04-26        CAPILLARY BLOOD GLUCOSE              RADIOLOGY & ADDITIONAL TESTS:          Imaging Personally Reviewed:  [ ] YES  [ ] NO    Consultant(s) Notes Reviewed:  [ ] YES  [ ] NO    Care Discussed with Consultants/Other Providers [x ] YES  [ ] NO

## 2020-04-27 NOTE — PHYSICAL THERAPY INITIAL EVALUATION ADULT - GENERAL OBSERVATIONS, REHAB EVAL
Pt is alert, oriented x 4, follow directions, on nasal cannula at 2.5 LPM, c/o weakness, coughing aggravated by activities.

## 2020-04-27 NOTE — CONSULT NOTE ADULT - ASSESSMENT
Lip cellulitis : add augmentin  covid 34 yr old  male seen with   1.Lip cellulitis : after suing NRB which must have caused initial dryness of lips which then became superinfected with start augmentin for 5-7 days  doesn't appear to be a cold sore and no h.o previous cold sores  2.Cough L severe cough sec to COVID not improving with hycodan sh add tessalon   3.covid Pneumonia : off NRB   s/p actemra and steroids  s/p HCQ and reyetaz  markers like ferretin trending down  4.Hypoxia : on nasal canula   sec to covid   off NRB bow

## 2020-04-28 LAB
ANION GAP SERPL CALC-SCNC: 5 MMOL/L — SIGNIFICANT CHANGE UP (ref 5–17)
BUN SERPL-MCNC: 17 MG/DL — SIGNIFICANT CHANGE UP (ref 7–23)
CALCIUM SERPL-MCNC: 8.6 MG/DL — SIGNIFICANT CHANGE UP (ref 8.5–10.1)
CHLORIDE SERPL-SCNC: 102 MMOL/L — SIGNIFICANT CHANGE UP (ref 96–108)
CO2 SERPL-SCNC: 28 MMOL/L — SIGNIFICANT CHANGE UP (ref 22–31)
CREAT SERPL-MCNC: 0.94 MG/DL — SIGNIFICANT CHANGE UP (ref 0.5–1.3)
GLUCOSE SERPL-MCNC: 99 MG/DL — SIGNIFICANT CHANGE UP (ref 70–99)
HCT VFR BLD CALC: 43.7 % — SIGNIFICANT CHANGE UP (ref 39–50)
HGB BLD-MCNC: 14.8 G/DL — SIGNIFICANT CHANGE UP (ref 13–17)
MCHC RBC-ENTMCNC: 29.5 PG — SIGNIFICANT CHANGE UP (ref 27–34)
MCHC RBC-ENTMCNC: 33.9 GM/DL — SIGNIFICANT CHANGE UP (ref 32–36)
MCV RBC AUTO: 87.2 FL — SIGNIFICANT CHANGE UP (ref 80–100)
NRBC # BLD: 0 /100 WBCS — SIGNIFICANT CHANGE UP (ref 0–0)
PLATELET # BLD AUTO: 349 K/UL — SIGNIFICANT CHANGE UP (ref 150–400)
POTASSIUM SERPL-MCNC: 4.5 MMOL/L — SIGNIFICANT CHANGE UP (ref 3.5–5.3)
POTASSIUM SERPL-SCNC: 4.5 MMOL/L — SIGNIFICANT CHANGE UP (ref 3.5–5.3)
RBC # BLD: 5.01 M/UL — SIGNIFICANT CHANGE UP (ref 4.2–5.8)
RBC # FLD: 14.2 % — SIGNIFICANT CHANGE UP (ref 10.3–14.5)
SODIUM SERPL-SCNC: 135 MMOL/L — SIGNIFICANT CHANGE UP (ref 135–145)
WBC # BLD: 3.92 K/UL — SIGNIFICANT CHANGE UP (ref 3.8–10.5)
WBC # FLD AUTO: 3.92 K/UL — SIGNIFICANT CHANGE UP (ref 3.8–10.5)

## 2020-04-28 PROCEDURE — 99233 SBSQ HOSP IP/OBS HIGH 50: CPT

## 2020-04-28 RX ADMIN — ENOXAPARIN SODIUM 40 MILLIGRAM(S): 100 INJECTION SUBCUTANEOUS at 05:57

## 2020-04-28 RX ADMIN — Medication 10 MILLILITER(S): at 06:02

## 2020-04-28 RX ADMIN — ENOXAPARIN SODIUM 40 MILLIGRAM(S): 100 INJECTION SUBCUTANEOUS at 18:20

## 2020-04-28 RX ADMIN — Medication 10 MILLILITER(S): at 17:29

## 2020-04-28 RX ADMIN — OXYCODONE AND ACETAMINOPHEN 1 TABLET(S): 5; 325 TABLET ORAL at 16:24

## 2020-04-28 RX ADMIN — Medication 1 TABLET(S): at 18:20

## 2020-04-28 RX ADMIN — Medication 1 TABLET(S): at 05:57

## 2020-04-28 NOTE — PROGRESS NOTE ADULT - COVID-19 NEGATIVE LAB RESULT
COVID-19 ruled in despite negative lab finding

## 2020-04-28 NOTE — PROGRESS NOTE ADULT - PROBLEM SELECTOR PROBLEM 1
Conveyed results to pt.   
2019 novel coronavirus disease (COVID-19)

## 2020-04-28 NOTE — PROGRESS NOTE ADULT - PROBLEM SELECTOR PLAN 3
Currently on non rebreather at 12 liters  comfortable
Currently on non rebreather at 15 liters  comfortable
DVTp: Lovenox
Left lower lip, ?cellulitis   discussed with ID will start Augmentin
cellulitis   improving w/ ABx, will cont Augmentin
Currently on non rebreather at 12 liters  comfortable

## 2020-04-28 NOTE — PROGRESS NOTE ADULT - ASSESSMENT
33 y/o -American with COVID  19 still requiring 100 percent non rebreather but makers of inflammation slowly getting better.  Guarded prognosis  please call Wife Nancy at [phone] with updates

## 2020-04-28 NOTE — PROGRESS NOTE ADULT - SUBJECTIVE AND OBJECTIVE BOX
Patient is a 34y old  Male who presents with a chief complaint of Covid (27 Apr 2020 17:06)      INTERVAL HPI/ OVERNIGHT EVENTS: Pt was seen and examined at bedside today, pt desaturates into the 80s with exertion, pt admits the lip rash is feeling better.      MEDICATIONS  (STANDING):  amoxicillin  875 milliGRAM(s)/clavulanate 1 Tablet(s) Oral two times a day  enoxaparin Injectable 40 milliGRAM(s) SubCutaneous every 12 hours    MEDICATIONS  (PRN):  acetaminophen   Tablet .. 650 milliGRAM(s) Oral every 4 hours PRN Temp greater or equal to 38.5C (101.3F)  acetaminophen   Tablet .. 650 milliGRAM(s) Oral every 6 hours PRN Mild Pain (1 - 3)  acetaminophen  Suppository .. 650 milliGRAM(s) Rectal every 4 hours PRN Temp greater or equal to 38.5C (101.3F)  ALBUTerol    90 MICROgram(s) HFA Inhaler 2 Puff(s) Inhalation every 4 hours PRN Shortness of Breath and/or Wheezing  benzonatate 100 milliGRAM(s) Oral three times a day PRN Cough  guaifenesin/dextromethorphan  Syrup 10 milliLiter(s) Oral every 4 hours PRN Cough  ondansetron Injectable 4 milliGRAM(s) IV Push every 6 hours PRN Nausea and/or Vomiting  oxycodone    5 mG/acetaminophen 325 mG 1 Tablet(s) Oral every 4 hours PRN Severe Pain (7 - 10)      Allergies    No Known Allergies    Intolerances        REVIEW OF SYSTEMS:    Unable to examine due to [ ] Encephalopathy [ ] Advanced Dementia [ ] Expressive Aphasia [ ] Non-verbal patient    CONSTITUTIONAL: No fever, NO generalized weakness/Fatigue, No weight loss  EYES: No eye pain, visual disturbances, or discharge  ENMT:  No difficulty hearing, tinnitus, vertigo; No sinus or throat pain  NECK: No pain or stiffness  RESPIRATORY: positive shortness of breath,  and cough, no wheezing, sputum or hemoptysis   CARDIOVASCULAR: No chest pain, palpitations, or leg swelling  GASTROINTESTINAL: No abdominal pain. No nausea, vomiting, diarrhea or constipation. No melena or hematochezia.  GENITOURINARY: No dysuria, frequency, hematuria, or incontinence  NEUROLOGICAL: No headaches, Dizziness, memory loss, loss of strength, numbness, or tremors  SKIN: lip rash   MUSCULOSKELETAL: pleuritic chest/back pain   PSYCHIATRIC: No depression, anxiety, mood swings, or difficulty sleeping  HEME/LYMPH: No easy bruising, or bleeding gums        Vital Signs Last 24 Hrs  T(C): 36.7 (28 Apr 2020 11:50), Max: 36.7 (27 Apr 2020 19:53)  T(F): 98 (28 Apr 2020 11:50), Max: 98 (27 Apr 2020 19:53)  HR: 90 (28 Apr 2020 11:50) (90 - 96)  BP: 125/67 (28 Apr 2020 11:50) (11/75 - 125/67)  BP(mean): --  RR: 20 (28 Apr 2020 14:19) (17 - 20)  SpO2: 87% (28 Apr 2020 14:19) (87% - 100%)    PHYSICAL EXAM:  GENERAL: NAD, well-developed, well-groomed  HEAD:  Atraumatic, Normocephalic  EYES: conjunctiva and sclera clear  ENMT: Moist mucous membranes  NECK: Supple, No JVD, Normal thyroid  CHEST/LUNG: Clear to Auscultation bilaterally; No rales, rhonchi, wheezing, or rubs  HEART: Regular rate and rhythm; No murmurs, rubs, or gallops  ABDOMEN: Soft, Nontender, Nondistended; Bowel sounds present  EXTREMITIES:  2+ Peripheral Pulses, No clubbing, cyanosis, or edema  SKIN: left lip skin eruption; improving    NERVOUS SYSTEM:  Alert & Oriented X3, Good concentration; Motor Strength 5/5 B/L upper and lower extremities    LABS:                        14.8   3.92  )-----------( 349      ( 28 Apr 2020 07:57 )             43.7     04-28    135  |  102  |  17  ----------------------------<  99  4.5   |  28  |  0.94    Ca    8.6      28 Apr 2020 07:57          CAPILLARY BLOOD GLUCOSE              RADIOLOGY & ADDITIONAL TESTS:          Imaging Personally Reviewed:  [ ] YES  [ ] NO    Consultant(s) Notes Reviewed:  [ ] YES  [ ] NO    Care Discussed with Consultants/Other Providers [x ] YES  [ ] NO

## 2020-04-28 NOTE — PROGRESS NOTE ADULT - PROBLEM SELECTOR PLAN 1
CXR: B/L multifocal PNA   s/p Plaquenil, Reyataz, and tocilizumab    cont w/ prone positioning  Lovenox
CXR: B/L multifocal PNA   s/p Plaquenil, Reyataz, and tocilizumab    cont w/ prone positioning  Lovenox
CXR: B/L multifocal PNA   s/p plaquenil, Reyataz, and tocilizumab    markers and symptoms are improving  cont w/ prone positioning  Lovenox
CXR: B/L multifocal PNA   s/p tocilizumab    markers are improving, dependent on NRB   cont w/ prone positioning  Lovenox
CXR: B/L multifocal PNA   s/p tocilizumab    markers are improving, dependent on NRB   cont w/ prone positioning  Lovenox
s/p toci   prone positioning
s/p toci   prone positioning
s/p toci   prone positioning  Lovenox
will add toci   prone positioning
s/p toci   prone positioning

## 2020-04-28 NOTE — PROGRESS NOTE ADULT - PROBLEM SELECTOR PLAN 2
On Reyataz   and Plaquenil
On Reyataz   and Plaquenil
On Reyataz   and Plaquenil  steroids
On Reyataz   and Plaquenil  steroids started
now on nasal canula, will cont to titrate down   s/p IV steroids
still NRB dependence.   cont w/ IV steroids.
still NRB dependence.   cont w/ IV steroids.
successfully titrate to nasal canula today   will d/c IV steroids.
On Reyataz   and Plaquenil  steroids started

## 2020-04-28 NOTE — PROGRESS NOTE ADULT - PROBLEM SELECTOR PROBLEM 2
Acute respiratory failure, unspecified whether with hypoxia or hypercapnia
Pneumonia due to COVID-19 virus

## 2020-04-28 NOTE — CHART NOTE - NSCHARTNOTEFT_GEN_A_CORE
Assessment: Pt in isolation: ACO for COVID 19+; precautions maintained. Pt admitted c COVID 19 infection, acute respiratory failure. Pt previously with poor to fair PO intake. Now improved. No GI distress noted; pt tolerating texture and consistency.     Factors impacting intake: [ x] none [ ] nausea  [ ] vomiting [ ] diarrhea [ ] constipation  [ ]chewing problems [ ] swallowing issues  [ ] other:     Diet Prescription: Diet, Regular:   Supplement Feeding Modality:  Oral  Ensure Enlive Cans or Servings Per Day:  1       Frequency:  Daily (04-22-20 @ 16:35)    Intake:  100%    Current Weight:  83.0 (04/25 most recent wt)   % Weight Change: unable to assess     Physical appearance: BMI 32.1; no edema noted     Pertinent Medications: MEDICATIONS  (STANDING):  amoxicillin  875 milliGRAM(s)/clavulanate 1 Tablet(s) Oral two times a day  enoxaparin Injectable 40 milliGRAM(s) SubCutaneous every 12 hours    MEDICATIONS  (PRN):  acetaminophen   Tablet .. 650 milliGRAM(s) Oral every 4 hours PRN Temp greater or equal to 38.5C (101.3F)  acetaminophen   Tablet .. 650 milliGRAM(s) Oral every 6 hours PRN Mild Pain (1 - 3)  acetaminophen  Suppository .. 650 milliGRAM(s) Rectal every 4 hours PRN Temp greater or equal to 38.5C (101.3F)  ALBUTerol    90 MICROgram(s) HFA Inhaler 2 Puff(s) Inhalation every 4 hours PRN Shortness of Breath and/or Wheezing  benzonatate 100 milliGRAM(s) Oral three times a day PRN Cough  guaifenesin/dextromethorphan  Syrup 10 milliLiter(s) Oral every 4 hours PRN Cough  ondansetron Injectable 4 milliGRAM(s) IV Push every 6 hours PRN Nausea and/or Vomiting  oxycodone    5 mG/acetaminophen 325 mG 1 Tablet(s) Oral every 4 hours PRN Severe Pain (7 - 10)    Pertinent Labs: 04-28 Na 135 mmol/L Glu 99 mg/dL K+ 4.5 mmol/L Cr 0.94 mg/dL BUN 17 mg/dL Phos n/a   Alb n/a   PAB n/a   Hgb 14.8 g/dL Hct 43.7 % HgA1C n/a    Glucose, Serum: 99 mg/dL   24Hr FS:  Skin: Skin intact     Estimated Needs:   [x ] no change since previous assessment (04-)  [ ] recalculated:     Previous Nutrition Diagnosis:   Nutrition Diagnostic Terminology #1 Other...   Other predicted suboptimal energy intake.   Etiology decreased ability to consume sufficient energy coronavirus infection.   Signs/Symptoms depressed oral intake PTA and since admission.     Goal/Expected Outcome pt to consume >75% of meals & supplement. (met)         Nutrition Diagnosis is [ ] ongoing  [x ] resolved  [x ] improved  [ ] not applicable       New Nutrition Diagnosis: [ x] not applicable       Interventions:   Recommend  [x] Continue: current diet Rx   [ ] Change Diet To:  [ ] Nutrition Supplement:  [ ] Nutrition Support:  [ ] Other:     Monitoring and Evaluation:   [ x] PO intake [ x ] Tolerance to diet prescription [ x ] weights [ x ] labs[ x ] follow up per protocol  [ ] other:

## 2020-04-28 NOTE — PROGRESS NOTE ADULT - PROBLEM SELECTOR PROBLEM 3
Acute respiratory failure, unspecified whether with hypoxia or hypercapnia
Preventive measure
Rash and nonspecific skin eruption
Rash and nonspecific skin eruption
Acute respiratory failure, unspecified whether with hypoxia or hypercapnia

## 2020-04-29 PROCEDURE — 99231 SBSQ HOSP IP/OBS SF/LOW 25: CPT

## 2020-04-29 PROCEDURE — 99233 SBSQ HOSP IP/OBS HIGH 50: CPT

## 2020-04-29 RX ADMIN — Medication 1 TABLET(S): at 17:21

## 2020-04-29 RX ADMIN — Medication 100 MILLIGRAM(S): at 01:00

## 2020-04-29 RX ADMIN — Medication 100 MILLIGRAM(S): at 22:25

## 2020-04-29 RX ADMIN — Medication 10 MILLILITER(S): at 05:14

## 2020-04-29 RX ADMIN — Medication 10 MILLILITER(S): at 17:21

## 2020-04-29 RX ADMIN — Medication 10 MILLILITER(S): at 00:59

## 2020-04-29 RX ADMIN — ENOXAPARIN SODIUM 40 MILLIGRAM(S): 100 INJECTION SUBCUTANEOUS at 17:21

## 2020-04-29 RX ADMIN — ENOXAPARIN SODIUM 40 MILLIGRAM(S): 100 INJECTION SUBCUTANEOUS at 05:07

## 2020-04-29 RX ADMIN — Medication 1 TABLET(S): at 05:07

## 2020-04-29 NOTE — PROGRESS NOTE ADULT - SUBJECTIVE AND OBJECTIVE BOX
Patient is a 34y old  Male who presents with a chief complaint of Covid-19 (29 Apr 2020 17:09)      INTERVAL HPI / OVERNIGHT EVENTS:    MEDICATIONS  (STANDING):  amoxicillin  875 milliGRAM(s)/clavulanate 1 Tablet(s) Oral two times a day  enoxaparin Injectable 40 milliGRAM(s) SubCutaneous every 12 hours    MEDICATIONS  (PRN):  acetaminophen   Tablet .. 650 milliGRAM(s) Oral every 4 hours PRN Temp greater or equal to 38.5C (101.3F)  acetaminophen   Tablet .. 650 milliGRAM(s) Oral every 6 hours PRN Mild Pain (1 - 3)  acetaminophen  Suppository .. 650 milliGRAM(s) Rectal every 4 hours PRN Temp greater or equal to 38.5C (101.3F)  ALBUTerol    90 MICROgram(s) HFA Inhaler 2 Puff(s) Inhalation every 4 hours PRN Shortness of Breath and/or Wheezing  benzonatate 100 milliGRAM(s) Oral three times a day PRN Cough  guaifenesin/dextromethorphan  Syrup 10 milliLiter(s) Oral every 4 hours PRN Cough  ondansetron Injectable 4 milliGRAM(s) IV Push every 6 hours PRN Nausea and/or Vomiting  oxycodone    5 mG/acetaminophen 325 mG 1 Tablet(s) Oral every 4 hours PRN Severe Pain (7 - 10)      Vital Signs Last 24 Hrs  T(C): 36.6 (29 Apr 2020 16:42), Max: 37 (29 Apr 2020 04:51)  T(F): 97.8 (29 Apr 2020 16:42), Max: 98.6 (29 Apr 2020 04:51)  HR: 97 (29 Apr 2020 16:42) (79 - 103)  BP: 111/75 (29 Apr 2020 16:42) (111/75 - 113/64)  BP(mean): --  RR: 19 (29 Apr 2020 16:42) (18 - 20)  SpO2: 99% (29 Apr 2020 16:42) (96% - 99%)    Review of systems:  General : no fever /chills,fatigue  CVS : no chest pain, palpitations  Lungs : no shortness of breath, cough  GI : no abdominal pain,vomiting, diarrhea   : no dysuria,hematuria        PHYSICAL EXAM:  General :NAD  Constitutional:  well-groomed, well-developed  Respiratory: CTAB/L  Cardiovascular: S1 and S2, RRR, no M/G/R  Gastrointestinal: BS+, soft, NT/ND  Extremities: No peripheral edema  Vascular: 2+ peripheral pulses  Skin: No rashes      LABS:                        14.8   3.92  )-----------( 349      ( 28 Apr 2020 07:57 )             43.7     04-28    135  |  102  |  17  ----------------------------<  99  4.5   |  28  |  0.94    Ca    8.6      28 Apr 2020 07:57            MICROBIOLOGY:  RECENT CULTURES:        RADIOLOGY & ADDITIONAL STUDIES: Patient is a 34y old  Male who presents with a chief complaint of Covid-19 (29 Apr 2020 17:09)      INTERVAL HPI / OVERNIGHT EVENTS: doing better     MEDICATIONS  (STANDING):  amoxicillin  875 milliGRAM(s)/clavulanate 1 Tablet(s) Oral two times a day  enoxaparin Injectable 40 milliGRAM(s) SubCutaneous every 12 hours    MEDICATIONS  (PRN):  acetaminophen   Tablet .. 650 milliGRAM(s) Oral every 4 hours PRN Temp greater or equal to 38.5C (101.3F)  acetaminophen   Tablet .. 650 milliGRAM(s) Oral every 6 hours PRN Mild Pain (1 - 3)  acetaminophen  Suppository .. 650 milliGRAM(s) Rectal every 4 hours PRN Temp greater or equal to 38.5C (101.3F)  ALBUTerol    90 MICROgram(s) HFA Inhaler 2 Puff(s) Inhalation every 4 hours PRN Shortness of Breath and/or Wheezing  benzonatate 100 milliGRAM(s) Oral three times a day PRN Cough  guaifenesin/dextromethorphan  Syrup 10 milliLiter(s) Oral every 4 hours PRN Cough  ondansetron Injectable 4 milliGRAM(s) IV Push every 6 hours PRN Nausea and/or Vomiting  oxycodone    5 mG/acetaminophen 325 mG 1 Tablet(s) Oral every 4 hours PRN Severe Pain (7 - 10)      Vital Signs Last 24 Hrs  T(C): 36.6 (29 Apr 2020 16:42), Max: 37 (29 Apr 2020 04:51)  T(F): 97.8 (29 Apr 2020 16:42), Max: 98.6 (29 Apr 2020 04:51)  HR: 97 (29 Apr 2020 16:42) (79 - 103)  BP: 111/75 (29 Apr 2020 16:42) (111/75 - 113/64)  BP(mean): --  RR: 19 (29 Apr 2020 16:42) (18 - 20)  SpO2: 99% (29 Apr 2020 16:42) (96% - 99%)    Review of systems:  General : no fever /chills, fatigue  CVS : no chest pain, palpitations  Lungs : no shortness of breath, cough  GI : no abdominal pain, vomiting, diarrhea   : no dysuria, hematuria        PHYSICAL EXAM:  General :NAD  Constitutional:  well-groomed, well-developed  Respiratory: CTAB/L  Cardiovascular: S1 and S2, RRR, no M/G/R  Gastrointestinal: BS+, soft, NT/ND  Extremities: No peripheral edema  Vascular: 2+ peripheral pulses  Skin: lip crusting ,wound slightly better ,less tender      LABS:                        14.8   3.92  )-----------( 349      ( 28 Apr 2020 07:57 )             43.7     04-28    135  |  102  |  17  ----------------------------<  99  4.5   |  28  |  0.94    Ca    8.6      28 Apr 2020 07:57            MICROBIOLOGY:  RECENT CULTURES:        RADIOLOGY & ADDITIONAL STUDIES:

## 2020-04-29 NOTE — PROGRESS NOTE ADULT - ASSESSMENT
33 y/o -American with COVID-19 still requiring 100 percent non rebreather but makers of inflammation slowly getting better.      PNA secondary to COVID-19:  - CXR: B/L multifocal PNA   - s/p Plaquenil, Reyataz, and tocilizumab    - cont w/ prone positioning  - Trend markers      Acute hypoxics respiratory failure:  - Now on nasal canula, will cont to titrate down   - s/p IV steroids.         Rash and nonspecific skin eruption:  - Cellulitis   - Improving w/ ABx, will cont Augmentin.       DVT pps:  -  Lovenox    Disposition: home.

## 2020-04-29 NOTE — PROGRESS NOTE ADULT - SUBJECTIVE AND OBJECTIVE BOX
Patient is a 34y old  Male who presents with a chief complaint of Covid (28 Apr 2020 15:50)      INTERVAL HPI/OVERNIGHT EVENTS:  Pt was seen and examined, no acute events.    MEDICATIONS  (STANDING):  amoxicillin  875 milliGRAM(s)/clavulanate 1 Tablet(s) Oral two times a day  enoxaparin Injectable 40 milliGRAM(s) SubCutaneous every 12 hours    MEDICATIONS  (PRN):  acetaminophen   Tablet .. 650 milliGRAM(s) Oral every 4 hours PRN Temp greater or equal to 38.5C (101.3F)  acetaminophen   Tablet .. 650 milliGRAM(s) Oral every 6 hours PRN Mild Pain (1 - 3)  acetaminophen  Suppository .. 650 milliGRAM(s) Rectal every 4 hours PRN Temp greater or equal to 38.5C (101.3F)  ALBUTerol    90 MICROgram(s) HFA Inhaler 2 Puff(s) Inhalation every 4 hours PRN Shortness of Breath and/or Wheezing  benzonatate 100 milliGRAM(s) Oral three times a day PRN Cough  guaifenesin/dextromethorphan  Syrup 10 milliLiter(s) Oral every 4 hours PRN Cough  ondansetron Injectable 4 milliGRAM(s) IV Push every 6 hours PRN Nausea and/or Vomiting  oxycodone    5 mG/acetaminophen 325 mG 1 Tablet(s) Oral every 4 hours PRN Severe Pain (7 - 10)      Allergies  No Known Allergies        Vital Signs Last 24 Hrs  T(C): 36.6 (29 Apr 2020 16:42), Max: 37 (29 Apr 2020 04:51)  T(F): 97.8 (29 Apr 2020 16:42), Max: 98.6 (29 Apr 2020 04:51)  HR: 97 (29 Apr 2020 16:42) (79 - 103)  BP: 111/75 (29 Apr 2020 16:42) (111/75 - 113/64)  BP(mean): --  RR: 19 (29 Apr 2020 16:42) (18 - 20)  SpO2: 99% (29 Apr 2020 16:42) (96% - 99%)      PHYSICAL EXAM:  GENERAL: NAD  HEAD:  Atraumatic  EYES: PERRLA  NERVOUS SYSTEM:  A, O x 3, non focal  CHEST/LUNG: Clear  HEART: RRR  ABDOMEN: Soft, non tender  EXTREMITIES:  no edema      LABS:                        14.8   3.92  )-----------( 349      ( 28 Apr 2020 07:57 )             43.7     04-28    135  |  102  |  17  ----------------------------<  99  4.5   |  28  |  0.94    Ca    8.6      28 Apr 2020 07:57          CAPILLARY BLOOD GLUCOSE          RADIOLOGY & ADDITIONAL TESTS:    Imaging Personally Reviewed:  [ ] YES  [ ] NO    Consultant(s) Notes Reviewed:  [ ] YES  [ ] NO    Care Discussed with Consultants/Other Providers [ ] YES  [ ] NO

## 2020-04-29 NOTE — PROGRESS NOTE ADULT - ASSESSMENT
improving lip cellulitis and cough  covid 34 yr old  male seen with   1.Lip cellulitis : after suing NRB which must have caused initial dryness of lips which then became superinfected with start augmentin for 5-7 days  doesn't appear to be a cold sore and no h.o previous cold sores  improving lip cellulitis and cough    2.Cough L severe cough sec to COVID not improving with hycodan and tessalon   improving slightly    3.covid Pneumonia : off NRB   s/p Actemra and steroids  s/p HCQ and reyetaz  markers like ferretin trending down    4.Hypoxia : on nasal canula   sec to covid   off NRB now

## 2020-04-30 LAB
ALBUMIN SERPL ELPH-MCNC: 2.7 G/DL — LOW (ref 3.3–5)
ALP SERPL-CCNC: 51 U/L — SIGNIFICANT CHANGE UP (ref 40–120)
ALT FLD-CCNC: 265 U/L — HIGH (ref 12–78)
ANION GAP SERPL CALC-SCNC: 5 MMOL/L — SIGNIFICANT CHANGE UP (ref 5–17)
AST SERPL-CCNC: 60 U/L — HIGH (ref 15–37)
BILIRUB SERPL-MCNC: 0.4 MG/DL — SIGNIFICANT CHANGE UP (ref 0.2–1.2)
BUN SERPL-MCNC: 15 MG/DL — SIGNIFICANT CHANGE UP (ref 7–23)
CALCIUM SERPL-MCNC: 8.3 MG/DL — LOW (ref 8.5–10.1)
CHLORIDE SERPL-SCNC: 106 MMOL/L — SIGNIFICANT CHANGE UP (ref 96–108)
CO2 SERPL-SCNC: 30 MMOL/L — SIGNIFICANT CHANGE UP (ref 22–31)
CREAT SERPL-MCNC: 1.05 MG/DL — SIGNIFICANT CHANGE UP (ref 0.5–1.3)
D DIMER BLD IA.RAPID-MCNC: 427 NG/ML DDU — HIGH
FERRITIN SERPL-MCNC: 399 NG/ML — SIGNIFICANT CHANGE UP (ref 30–400)
GLUCOSE SERPL-MCNC: 97 MG/DL — SIGNIFICANT CHANGE UP (ref 70–99)
HCT VFR BLD CALC: 40.6 % — SIGNIFICANT CHANGE UP (ref 39–50)
HGB BLD-MCNC: 13.8 G/DL — SIGNIFICANT CHANGE UP (ref 13–17)
MCHC RBC-ENTMCNC: 29.8 PG — SIGNIFICANT CHANGE UP (ref 27–34)
MCHC RBC-ENTMCNC: 34 GM/DL — SIGNIFICANT CHANGE UP (ref 32–36)
MCV RBC AUTO: 87.7 FL — SIGNIFICANT CHANGE UP (ref 80–100)
NRBC # BLD: 0 /100 WBCS — SIGNIFICANT CHANGE UP (ref 0–0)
PLATELET # BLD AUTO: 274 K/UL — SIGNIFICANT CHANGE UP (ref 150–400)
POTASSIUM SERPL-MCNC: 4.4 MMOL/L — SIGNIFICANT CHANGE UP (ref 3.5–5.3)
POTASSIUM SERPL-SCNC: 4.4 MMOL/L — SIGNIFICANT CHANGE UP (ref 3.5–5.3)
PROT SERPL-MCNC: 6.4 GM/DL — SIGNIFICANT CHANGE UP (ref 6–8.3)
RBC # BLD: 4.63 M/UL — SIGNIFICANT CHANGE UP (ref 4.2–5.8)
RBC # FLD: 14.8 % — HIGH (ref 10.3–14.5)
SODIUM SERPL-SCNC: 141 MMOL/L — SIGNIFICANT CHANGE UP (ref 135–145)
WBC # BLD: 3.2 K/UL — LOW (ref 3.8–10.5)
WBC # FLD AUTO: 3.2 K/UL — LOW (ref 3.8–10.5)

## 2020-04-30 PROCEDURE — 99233 SBSQ HOSP IP/OBS HIGH 50: CPT

## 2020-04-30 RX ADMIN — Medication 1 TABLET(S): at 05:40

## 2020-04-30 RX ADMIN — Medication 10 MILLILITER(S): at 23:28

## 2020-04-30 RX ADMIN — Medication 1 TABLET(S): at 17:04

## 2020-04-30 RX ADMIN — Medication 100 MILLIGRAM(S): at 21:17

## 2020-04-30 RX ADMIN — ENOXAPARIN SODIUM 40 MILLIGRAM(S): 100 INJECTION SUBCUTANEOUS at 05:40

## 2020-04-30 RX ADMIN — ENOXAPARIN SODIUM 40 MILLIGRAM(S): 100 INJECTION SUBCUTANEOUS at 17:05

## 2020-04-30 NOTE — PROGRESS NOTE ADULT - ASSESSMENT
35 y/o -American with COVID-19 still requiring 100 percent non rebreather but makers of inflammation slowly getting better.      PNA secondary to COVID-19:  - CXR: B/L multifocal PNA   - s/p Plaquenil, Reyataz, and tocilizumab    - cont w/ prone positioning  - Trend markers      Acute hypoxics respiratory failure:  - Now on nasal canula, will cont to titrate down   - s/p IV steroids.         Rash and nonspecific skin eruption:  - Cellulitis   - Improving w/ ABx, will cont Augmentin.       DVT pps:  -  Lovenox    Disposition: home.

## 2020-04-30 NOTE — PROGRESS NOTE ADULT - SUBJECTIVE AND OBJECTIVE BOX
Patient is a 34y old  Male who presents with a chief complaint of Covid (29 Apr 2020 23:54)      INTERVAL HPI/OVERNIGHT EVENTS:  Pt was seen and examined no acute events.    MEDICATIONS  (STANDING):  amoxicillin  875 milliGRAM(s)/clavulanate 1 Tablet(s) Oral two times a day  enoxaparin Injectable 40 milliGRAM(s) SubCutaneous every 12 hours    MEDICATIONS  (PRN):  acetaminophen   Tablet .. 650 milliGRAM(s) Oral every 4 hours PRN Temp greater or equal to 38.5C (101.3F)  acetaminophen   Tablet .. 650 milliGRAM(s) Oral every 6 hours PRN Mild Pain (1 - 3)  acetaminophen  Suppository .. 650 milliGRAM(s) Rectal every 4 hours PRN Temp greater or equal to 38.5C (101.3F)  ALBUTerol    90 MICROgram(s) HFA Inhaler 2 Puff(s) Inhalation every 4 hours PRN Shortness of Breath and/or Wheezing  benzonatate 100 milliGRAM(s) Oral three times a day PRN Cough  guaifenesin/dextromethorphan  Syrup 10 milliLiter(s) Oral every 4 hours PRN Cough  ondansetron Injectable 4 milliGRAM(s) IV Push every 6 hours PRN Nausea and/or Vomiting  oxycodone    5 mG/acetaminophen 325 mG 1 Tablet(s) Oral every 4 hours PRN Severe Pain (7 - 10)      Allergies  No Known Allergies        Vital Signs Last 24 Hrs  T(C): 36.8 (30 Apr 2020 11:57), Max: 36.8 (30 Apr 2020 05:19)  T(F): 98.2 (30 Apr 2020 11:57), Max: 98.3 (30 Apr 2020 05:19)  HR: 77 (30 Apr 2020 13:21) (77 - 100)  BP: 110/65 (30 Apr 2020 11:57) (101/69 - 110/65)  BP(mean): --  RR: 18 (30 Apr 2020 11:57) (17 - 18)  SpO2: 95% (30 Apr 2020 11:57) (95% - 98%)      PHYSICAL EXAM:  GENERAL: NAD  HEAD:  Atraumatic  EYES: PERRLA  NERVOUS SYSTEM:  A, O x 3, non focal  CHEST/LUNG: Clear  HEART: RRR  ABDOMEN: Soft, non tender  EXTREMITIES:  no edema      LABS:                        13.8   3.20  )-----------( 274      ( 30 Apr 2020 08:17 )             40.6     04-30    141  |  106  |  15  ----------------------------<  97  4.4   |  30  |  1.05    Ca    8.3<L>      30 Apr 2020 08:17    TPro  6.4  /  Alb  2.7<L>  /  TBili  0.4  /  DBili  x   /  AST  60<H>  /  ALT  265<H>  /  AlkPhos  51  04-30        CAPILLARY BLOOD GLUCOSE          RADIOLOGY & ADDITIONAL TESTS:    Imaging Personally Reviewed:  [ ] YES  [ ] NO    Consultant(s) Notes Reviewed:  [ ] YES  [ ] NO    Care Discussed with Consultants/Other Providers [ ] YES  [ ] NO

## 2020-05-01 PROCEDURE — 99231 SBSQ HOSP IP/OBS SF/LOW 25: CPT

## 2020-05-01 PROCEDURE — 99233 SBSQ HOSP IP/OBS HIGH 50: CPT

## 2020-05-01 RX ORDER — ALPRAZOLAM 0.25 MG
0.5 TABLET ORAL ONCE
Refills: 0 | Status: DISCONTINUED | OUTPATIENT
Start: 2020-05-01 | End: 2020-05-01

## 2020-05-01 RX ORDER — ALPRAZOLAM 0.25 MG
0.25 TABLET ORAL
Refills: 0 | Status: DISCONTINUED | OUTPATIENT
Start: 2020-05-01 | End: 2020-05-07

## 2020-05-01 RX ADMIN — Medication 1 TABLET(S): at 18:00

## 2020-05-01 RX ADMIN — ENOXAPARIN SODIUM 40 MILLIGRAM(S): 100 INJECTION SUBCUTANEOUS at 18:00

## 2020-05-01 RX ADMIN — Medication 0.5 MILLIGRAM(S): at 01:23

## 2020-05-01 RX ADMIN — ENOXAPARIN SODIUM 40 MILLIGRAM(S): 100 INJECTION SUBCUTANEOUS at 05:15

## 2020-05-01 RX ADMIN — Medication 10 MILLILITER(S): at 13:04

## 2020-05-01 RX ADMIN — Medication 100 MILLIGRAM(S): at 16:31

## 2020-05-01 RX ADMIN — Medication 1 TABLET(S): at 05:15

## 2020-05-01 RX ADMIN — Medication 100 MILLIGRAM(S): at 08:28

## 2020-05-01 RX ADMIN — Medication 100 MILLIGRAM(S): at 19:38

## 2020-05-01 RX ADMIN — Medication 0.25 MILLIGRAM(S): at 19:38

## 2020-05-01 NOTE — PROGRESS NOTE ADULT - ASSESSMENT
34 yr old  male seen with   1.Lip cellulitis : after suing NRB which must have caused initial dryness of lips which then became superinfected with start augmentin day 5 of 7 days  doesn't appear to be a cold sore and no h.o previous cold sores  improving lip cellulitis and cough    2.Cough L severe cough sec to COVID not improving with hycodan and tessalon   improving slightly    3.covid Pneumonia : off NRB   s/p Actemra and steroids  s/p HCQ and reyetaz  markers like ferretin trending down    4.Hypoxia : on nasal canula   sec to covid   off NRB now

## 2020-05-01 NOTE — PROGRESS NOTE ADULT - SUBJECTIVE AND OBJECTIVE BOX
Patient is a 34y old  Male who presents with a chief complaint of Covid (01 May 2020 11:10)      INTERVAL HPI / OVERNIGHT EVENTS:    MEDICATIONS  (STANDING):  amoxicillin  875 milliGRAM(s)/clavulanate 1 Tablet(s) Oral two times a day  enoxaparin Injectable 40 milliGRAM(s) SubCutaneous every 12 hours    MEDICATIONS  (PRN):  acetaminophen   Tablet .. 650 milliGRAM(s) Oral every 4 hours PRN Temp greater or equal to 38.5C (101.3F)  acetaminophen   Tablet .. 650 milliGRAM(s) Oral every 6 hours PRN Mild Pain (1 - 3)  acetaminophen  Suppository .. 650 milliGRAM(s) Rectal every 4 hours PRN Temp greater or equal to 38.5C (101.3F)  ALBUTerol    90 MICROgram(s) HFA Inhaler 2 Puff(s) Inhalation every 4 hours PRN Shortness of Breath and/or Wheezing  ALPRAZolam 0.25 milliGRAM(s) Oral two times a day PRN anxiety  benzonatate 100 milliGRAM(s) Oral three times a day PRN Cough  guaifenesin/dextromethorphan  Syrup 10 milliLiter(s) Oral every 4 hours PRN Cough  ondansetron Injectable 4 milliGRAM(s) IV Push every 6 hours PRN Nausea and/or Vomiting  oxycodone    5 mG/acetaminophen 325 mG 1 Tablet(s) Oral every 4 hours PRN Severe Pain (7 - 10)      Vital Signs Last 24 Hrs  T(C): 36.6 (01 May 2020 12:23), Max: 36.7 (30 Apr 2020 17:16)  T(F): 97.9 (01 May 2020 12:23), Max: 98 (30 Apr 2020 17:16)  HR: 96 (01 May 2020 12:23) (92 - 97)  BP: 118/74 (01 May 2020 12:23) (103/66 - 118/74)  BP(mean): --  RR: 20 (01 May 2020 15:15) (17 - 20)  SpO2: 98% (01 May 2020 15:15) (90% - 99%)    Review of systems:  General : no fever /chills,fatigue  CVS : no chest pain, palpitations  Lungs : no shortness of breath, cough  GI : no abdominal pain,vomiting, diarrhea   : no dysuria,hematuria        PHYSICAL EXAM:  General :NAD  Constitutional:  well-groomed, well-developed  Respiratory: CTAB/L  Cardiovascular: S1 and S2, RRR, no M/G/R  Gastrointestinal: BS+, soft, NT/ND  Extremities: No peripheral edema  Vascular: 2+ peripheral pulses  Skin: No rashes      LABS:                        13.8   3.20  )-----------( 274      ( 30 Apr 2020 08:17 )             40.6     04-30    141  |  106  |  15  ----------------------------<  97  4.4   |  30  |  1.05    Ca    8.3<L>      30 Apr 2020 08:17    TPro  6.4  /  Alb  2.7<L>  /  TBili  0.4  /  DBili  x   /  AST  60<H>  /  ALT  265<H>  /  AlkPhos  51  04-30          MICROBIOLOGY:  RECENT CULTURES:        RADIOLOGY & ADDITIONAL STUDIES: Patient is a 34y old  Male who presents with a chief complaint of Covid (01 May 2020 11:10)      INTERVAL HPI / OVERNIGHT EVENTS: doing ok     MEDICATIONS  (STANDING):  amoxicillin  875 milliGRAM(s)/clavulanate 1 Tablet(s) Oral two times a day  enoxaparin Injectable 40 milliGRAM(s) SubCutaneous every 12 hours    MEDICATIONS  (PRN):  acetaminophen   Tablet .. 650 milliGRAM(s) Oral every 4 hours PRN Temp greater or equal to 38.5C (101.3F)  acetaminophen   Tablet .. 650 milliGRAM(s) Oral every 6 hours PRN Mild Pain (1 - 3)  acetaminophen  Suppository .. 650 milliGRAM(s) Rectal every 4 hours PRN Temp greater or equal to 38.5C (101.3F)  ALBUTerol    90 MICROgram(s) HFA Inhaler 2 Puff(s) Inhalation every 4 hours PRN Shortness of Breath and/or Wheezing  ALPRAZolam 0.25 milliGRAM(s) Oral two times a day PRN anxiety  benzonatate 100 milliGRAM(s) Oral three times a day PRN Cough  guaifenesin/dextromethorphan  Syrup 10 milliLiter(s) Oral every 4 hours PRN Cough  ondansetron Injectable 4 milliGRAM(s) IV Push every 6 hours PRN Nausea and/or Vomiting  oxycodone    5 mG/acetaminophen 325 mG 1 Tablet(s) Oral every 4 hours PRN Severe Pain (7 - 10)      Vital Signs Last 24 Hrs  T(C): 36.6 (01 May 2020 12:23), Max: 36.7 (30 Apr 2020 17:16)  T(F): 97.9 (01 May 2020 12:23), Max: 98 (30 Apr 2020 17:16)  HR: 96 (01 May 2020 12:23) (92 - 97)  BP: 118/74 (01 May 2020 12:23) (103/66 - 118/74)  BP(mean): --  RR: 20 (01 May 2020 15:15) (17 - 20)  SpO2: 98% (01 May 2020 15:15) (90% - 99%)    Review of systems:  General : no fever /chills,fatigue  CVS : no chest pain, palpitations  Lungs : no shortness of breath, cough  GI : no abdominal pain,vomiting, diarrhea   : no dysuria,hematuria        PHYSICAL EXAM:  General :NAD  Constitutional:  well-groomed, well-developed  Respiratory: CTAB/L  Cardiovascular: S1 and S2, RRR, no M/G/R  Gastrointestinal: BS+, soft, NT/ND  Extremities: No peripheral edema  Vascular: 2+ peripheral pulses  Skin: resolving crusty area of lower lip      LABS:                        13.8   3.20  )-----------( 274      ( 30 Apr 2020 08:17 )             40.6     04-30    141  |  106  |  15  ----------------------------<  97  4.4   |  30  |  1.05    Ca    8.3<L>      30 Apr 2020 08:17    TPro  6.4  /  Alb  2.7<L>  /  TBili  0.4  /  DBili  x   /  AST  60<H>  /  ALT  265<H>  /  AlkPhos  51  04-30          MICROBIOLOGY:  RECENT CULTURES:        RADIOLOGY & ADDITIONAL STUDIES:

## 2020-05-01 NOTE — PROGRESS NOTE ADULT - SUBJECTIVE AND OBJECTIVE BOX
Patient is a 34y old  Male who presents with a chief complaint of Covid-19 (30 Apr 2020 17:02)      INTERVAL HPI/OVERNIGHT EVENTS: no events     MEDICATIONS  (STANDING):  amoxicillin  875 milliGRAM(s)/clavulanate 1 Tablet(s) Oral two times a day  enoxaparin Injectable 40 milliGRAM(s) SubCutaneous every 12 hours    MEDICATIONS  (PRN):  acetaminophen   Tablet .. 650 milliGRAM(s) Oral every 4 hours PRN Temp greater or equal to 38.5C (101.3F)  acetaminophen   Tablet .. 650 milliGRAM(s) Oral every 6 hours PRN Mild Pain (1 - 3)  acetaminophen  Suppository .. 650 milliGRAM(s) Rectal every 4 hours PRN Temp greater or equal to 38.5C (101.3F)  ALBUTerol    90 MICROgram(s) HFA Inhaler 2 Puff(s) Inhalation every 4 hours PRN Shortness of Breath and/or Wheezing  ALPRAZolam 0.25 milliGRAM(s) Oral two times a day PRN anxiety  benzonatate 100 milliGRAM(s) Oral three times a day PRN Cough  guaifenesin/dextromethorphan  Syrup 10 milliLiter(s) Oral every 4 hours PRN Cough  ondansetron Injectable 4 milliGRAM(s) IV Push every 6 hours PRN Nausea and/or Vomiting  oxycodone    5 mG/acetaminophen 325 mG 1 Tablet(s) Oral every 4 hours PRN Severe Pain (7 - 10)      Allergies    No Known Allergies    Intolerances         Vital Signs Last 24 Hrs  T(C): 36.7 (01 May 2020 04:38), Max: 36.8 (30 Apr 2020 11:57)  T(F): 98 (01 May 2020 04:38), Max: 98.2 (30 Apr 2020 11:57)  HR: 97 (01 May 2020 04:38) (77 - 98)  BP: 103/66 (01 May 2020 04:38) (103/66 - 115/72)  BP(mean): --  RR: 18 (01 May 2020 04:38) (18 - 20)  SpO2: 99% (01 May 2020 04:38) (90% - 99%)    PHYSICAL EXAM:  GENERAL: NAD, well-groomed, well-developed  HEAD:  Atraumatic, Normocephalic  EYES: EOMI, PERRLA, conjunctiva and sclera clear  ENMT: No tonsillar erythema, exudates, or enlargement; Moist mucous membranes, Good dentition, No lesions  NECK: Supple, No JVD, Normal thyroid  NERVOUS SYSTEM:  Alert & Oriented X3, Good concentration; Motor Strength 5/5 B/L upper and lower extremities; DTRs 2+ intact and symmetric  CHEST/LUNG: Clear to percussion bilaterally; No rales, rhonchi, wheezing, or rubs  HEART: Regular rate and rhythm; No murmurs, rubs, or gallops  ABDOMEN: Soft, Nontender, Nondistended; Bowel sounds present  EXTREMITIES:  2+ Peripheral Pulses, No clubbing, cyanosis, or edema  LYMPH: No lymphadenopathy noted  SKIN: No rashes or lesions    LABS:                        13.8   3.20  )-----------( 274      ( 30 Apr 2020 08:17 )             40.6     04-30    141  |  106  |  15  ----------------------------<  97  4.4   |  30  |  1.05    Ca    8.3<L>      30 Apr 2020 08:17    TPro  6.4  /  Alb  2.7<L>  /  TBili  0.4  /  DBili  x   /  AST  60<H>  /  ALT  265<H>  /  AlkPhos  51  04-30        CAPILLARY BLOOD GLUCOSE          RADIOLOGY & ADDITIONAL TESTS:    Imaging Personally Reviewed:  [ X] YES  [ ] NO    Consultant(s) Notes Reviewed:  [ X] YES  [ ] NO    Care Discussed with Consultants/Other Providers [X ] YES  [ ] NO

## 2020-05-01 NOTE — PROGRESS NOTE ADULT - ASSESSMENT
33 y/o -American with COVID-19 still requiring 100 percent non rebreather but makers of inflammation slowly getting better.      PNA secondary to COVID-19: on NRB this AM, prn xanax for anxiety   - CXR: B/L multifocal PNA   - s/p Plaquenil, Reyataz, and tocilizumab    - cont w/ prone positioning  - Trend markers      Acute hypoxics respiratory failure:  - Now on nasal canula, will cont to titrate down   - s/p IV steroids.         Rash and nonspecific skin eruption:  - Cellulitis   - Improving w/ ABx, will cont Augmentin.       DVT pps:  -  Lovenox    Disposition: home.

## 2020-05-02 PROCEDURE — 99233 SBSQ HOSP IP/OBS HIGH 50: CPT

## 2020-05-02 RX ORDER — BENZOCAINE AND MENTHOL 5; 1 G/100ML; G/100ML
1 LIQUID ORAL EVERY 4 HOURS
Refills: 0 | Status: DISCONTINUED | OUTPATIENT
Start: 2020-05-02 | End: 2020-05-07

## 2020-05-02 RX ADMIN — Medication 10 MILLILITER(S): at 01:33

## 2020-05-02 RX ADMIN — Medication 100 MILLIGRAM(S): at 05:10

## 2020-05-02 RX ADMIN — Medication 1 TABLET(S): at 05:11

## 2020-05-02 RX ADMIN — Medication 1 TABLET(S): at 17:06

## 2020-05-02 RX ADMIN — ENOXAPARIN SODIUM 40 MILLIGRAM(S): 100 INJECTION SUBCUTANEOUS at 17:06

## 2020-05-02 RX ADMIN — Medication 0.25 MILLIGRAM(S): at 21:37

## 2020-05-02 RX ADMIN — BENZOCAINE AND MENTHOL 1 LOZENGE: 5; 1 LIQUID ORAL at 21:59

## 2020-05-02 RX ADMIN — Medication 100 MILLIGRAM(S): at 11:20

## 2020-05-02 RX ADMIN — Medication 0.25 MILLIGRAM(S): at 12:57

## 2020-05-02 RX ADMIN — BENZOCAINE AND MENTHOL 1 LOZENGE: 5; 1 LIQUID ORAL at 11:20

## 2020-05-02 RX ADMIN — BENZOCAINE AND MENTHOL 1 LOZENGE: 5; 1 LIQUID ORAL at 05:10

## 2020-05-02 RX ADMIN — ENOXAPARIN SODIUM 40 MILLIGRAM(S): 100 INJECTION SUBCUTANEOUS at 05:10

## 2020-05-02 NOTE — PROGRESS NOTE ADULT - ASSESSMENT
35 y/o -American with COVID-19 still requiring 100 percent non rebreather but makers of inflammation slowly getting better.      PNA secondary to COVID-19:   - Remains on NC, even though So2 improved, pt has panic attack and wants O2 back on.   - CXR: B/L multifocal PNA   - s/p Plaquenil, Reyataz, and tocilizumab    - cont w/ prone positioning  - Trend markers      Acute hypoxics respiratory failure:  - Now on nasal canula, will cont to titrate down   - s/p IV steroids.       Rash and nonspecific skin eruption:  - Cellulitis   - Improving w/ ABx, will cont Augmentin. dc tomorrow    Anxiety:  - PRN xanax.      DVT pps:  -  Lovenox    Disposition: home.

## 2020-05-02 NOTE — PROGRESS NOTE ADULT - SUBJECTIVE AND OBJECTIVE BOX
Patient is a 34y old  Male who presents with a chief complaint of Covid (01 May 2020 16:00)      INTERVAL HPI/OVERNIGHT EVENTS:  Pt was seen and examined, no acute events.    MEDICATIONS  (STANDING):  amoxicillin  875 milliGRAM(s)/clavulanate 1 Tablet(s) Oral two times a day  enoxaparin Injectable 40 milliGRAM(s) SubCutaneous every 12 hours    MEDICATIONS  (PRN):  acetaminophen   Tablet .. 650 milliGRAM(s) Oral every 4 hours PRN Temp greater or equal to 38.5C (101.3F)  acetaminophen   Tablet .. 650 milliGRAM(s) Oral every 6 hours PRN Mild Pain (1 - 3)  acetaminophen  Suppository .. 650 milliGRAM(s) Rectal every 4 hours PRN Temp greater or equal to 38.5C (101.3F)  ALBUTerol    90 MICROgram(s) HFA Inhaler 2 Puff(s) Inhalation every 4 hours PRN Shortness of Breath and/or Wheezing  ALPRAZolam 0.25 milliGRAM(s) Oral two times a day PRN anxiety  benzocaine 15 mG/menthol 3.6 mG (Sugar-Free) Lozenge 1 Lozenge Oral every 4 hours PRN Sore Throat  benzonatate 100 milliGRAM(s) Oral three times a day PRN Cough  guaifenesin/dextromethorphan  Syrup 10 milliLiter(s) Oral every 4 hours PRN Cough  ondansetron Injectable 4 milliGRAM(s) IV Push every 6 hours PRN Nausea and/or Vomiting  oxycodone    5 mG/acetaminophen 325 mG 1 Tablet(s) Oral every 4 hours PRN Severe Pain (7 - 10)      Allergies  No Known Allergies      Vital Signs Last 24 Hrs  T(C): 36.6 (02 May 2020 16:25), Max: 36.6 (02 May 2020 16:25)  T(F): 97.8 (02 May 2020 16:25), Max: 97.8 (02 May 2020 16:25)  HR: 98 (02 May 2020 16:25) (88 - 110)  BP: 112/66 (02 May 2020 16:25) (112/66 - 125/78)  BP(mean): --  RR: 18 (02 May 2020 16:25) (18 - 24)  SpO2: 96% (02 May 2020 16:25) (95% - 100%)      PHYSICAL EXAM:  GENERAL: NAD  HEAD:  Atraumatic  EYES: PERRLA  NERVOUS SYSTEM:  Awake, alert  CHEST/LUNG: Clear  HEART: RRR  ABDOMEN: Soft, non tender  EXTREMITIES:  no edema      LABS:      CAPILLARY BLOOD GLUCOSE      RADIOLOGY & ADDITIONAL TESTS:    Imaging Personally Reviewed:  [ ] YES  [ ] NO    Consultant(s) Notes Reviewed:  [ ] YES  [ ] NO    Care Discussed with Consultants/Other Providers [ ] YES  [ ] NO

## 2020-05-03 LAB
ALBUMIN SERPL ELPH-MCNC: 2.8 G/DL — LOW (ref 3.3–5)
ALP SERPL-CCNC: 45 U/L — SIGNIFICANT CHANGE UP (ref 40–120)
ALT FLD-CCNC: 217 U/L — HIGH (ref 12–78)
ANION GAP SERPL CALC-SCNC: 6 MMOL/L — SIGNIFICANT CHANGE UP (ref 5–17)
AST SERPL-CCNC: 67 U/L — HIGH (ref 15–37)
BILIRUB SERPL-MCNC: 0.4 MG/DL — SIGNIFICANT CHANGE UP (ref 0.2–1.2)
BUN SERPL-MCNC: 9 MG/DL — SIGNIFICANT CHANGE UP (ref 7–23)
CALCIUM SERPL-MCNC: 8.5 MG/DL — SIGNIFICANT CHANGE UP (ref 8.5–10.1)
CHLORIDE SERPL-SCNC: 108 MMOL/L — SIGNIFICANT CHANGE UP (ref 96–108)
CO2 SERPL-SCNC: 26 MMOL/L — SIGNIFICANT CHANGE UP (ref 22–31)
CREAT SERPL-MCNC: 1.1 MG/DL — SIGNIFICANT CHANGE UP (ref 0.5–1.3)
CRP SERPL-MCNC: <0.1 MG/DL — SIGNIFICANT CHANGE UP (ref 0–0.4)
D DIMER BLD IA.RAPID-MCNC: 200 NG/ML DDU — SIGNIFICANT CHANGE UP
FERRITIN SERPL-MCNC: 354 NG/ML — SIGNIFICANT CHANGE UP (ref 30–400)
GLUCOSE SERPL-MCNC: 97 MG/DL — SIGNIFICANT CHANGE UP (ref 70–99)
HCT VFR BLD CALC: 39.8 % — SIGNIFICANT CHANGE UP (ref 39–50)
HGB BLD-MCNC: 13.3 G/DL — SIGNIFICANT CHANGE UP (ref 13–17)
LDH SERPL L TO P-CCNC: 269 U/L — HIGH (ref 50–242)
MCHC RBC-ENTMCNC: 29.5 PG — SIGNIFICANT CHANGE UP (ref 27–34)
MCHC RBC-ENTMCNC: 33.4 GM/DL — SIGNIFICANT CHANGE UP (ref 32–36)
MCV RBC AUTO: 88.2 FL — SIGNIFICANT CHANGE UP (ref 80–100)
NRBC # BLD: 0 /100 WBCS — SIGNIFICANT CHANGE UP (ref 0–0)
PLATELET # BLD AUTO: 204 K/UL — SIGNIFICANT CHANGE UP (ref 150–400)
POTASSIUM SERPL-MCNC: 3.8 MMOL/L — SIGNIFICANT CHANGE UP (ref 3.5–5.3)
POTASSIUM SERPL-SCNC: 3.8 MMOL/L — SIGNIFICANT CHANGE UP (ref 3.5–5.3)
PROT SERPL-MCNC: 6 GM/DL — SIGNIFICANT CHANGE UP (ref 6–8.3)
RBC # BLD: 4.51 M/UL — SIGNIFICANT CHANGE UP (ref 4.2–5.8)
RBC # FLD: 14.7 % — HIGH (ref 10.3–14.5)
SODIUM SERPL-SCNC: 140 MMOL/L — SIGNIFICANT CHANGE UP (ref 135–145)
WBC # BLD: 3 K/UL — LOW (ref 3.8–10.5)
WBC # FLD AUTO: 3 K/UL — LOW (ref 3.8–10.5)

## 2020-05-03 PROCEDURE — 99233 SBSQ HOSP IP/OBS HIGH 50: CPT

## 2020-05-03 RX ADMIN — OXYCODONE AND ACETAMINOPHEN 1 TABLET(S): 5; 325 TABLET ORAL at 17:08

## 2020-05-03 RX ADMIN — Medication 100 MILLIGRAM(S): at 05:21

## 2020-05-03 RX ADMIN — Medication 10 MILLILITER(S): at 05:21

## 2020-05-03 RX ADMIN — Medication 100 MILLIGRAM(S): at 22:04

## 2020-05-03 RX ADMIN — Medication 0.25 MILLIGRAM(S): at 12:14

## 2020-05-03 RX ADMIN — ENOXAPARIN SODIUM 40 MILLIGRAM(S): 100 INJECTION SUBCUTANEOUS at 17:05

## 2020-05-03 RX ADMIN — BENZOCAINE AND MENTHOL 1 LOZENGE: 5; 1 LIQUID ORAL at 12:14

## 2020-05-03 RX ADMIN — Medication 100 MILLIGRAM(S): at 12:14

## 2020-05-03 RX ADMIN — Medication 1 TABLET(S): at 04:53

## 2020-05-03 RX ADMIN — Medication 0.25 MILLIGRAM(S): at 22:04

## 2020-05-03 RX ADMIN — BENZOCAINE AND MENTHOL 1 LOZENGE: 5; 1 LIQUID ORAL at 05:21

## 2020-05-03 RX ADMIN — ENOXAPARIN SODIUM 40 MILLIGRAM(S): 100 INJECTION SUBCUTANEOUS at 04:53

## 2020-05-03 NOTE — PROGRESS NOTE ADULT - ASSESSMENT
35 y/o -American with COVID-19 still requiring 100 percent non rebreather but makers of inflammation slowly getting better.      PNA secondary to COVID-19:   - Remains on NC, even though So2 improved, pt has panic attack and wants O2 back on.   - CXR: B/L multifocal PNA   - s/p Plaquenil, Reyataz, and tocilizumab    - cont w/ prone positioning  - Trend markers      Acute hypoxics respiratory failure:  - Now on nasal canula, will cont to titrate down   - s/p IV steroids.       Rash and nonspecific skin eruption:  - Cellulitis   - Improving w/ ABx, on Augmentin, will stop today    Anxiety:  - PRN xanax.      DVT pps:  -  Lovenox    Disposition: home.

## 2020-05-03 NOTE — PROGRESS NOTE ADULT - SUBJECTIVE AND OBJECTIVE BOX
Patient is a 34y old  Male who presents with a chief complaint of Covid (02 May 2020 18:46)      INTERVAL HPI/OVERNIGHT EVENTS:  Pt was seen and examined, no acute events.    MEDICATIONS  (STANDING):  amoxicillin  875 milliGRAM(s)/clavulanate 1 Tablet(s) Oral two times a day  enoxaparin Injectable 40 milliGRAM(s) SubCutaneous every 12 hours    MEDICATIONS  (PRN):  acetaminophen   Tablet .. 650 milliGRAM(s) Oral every 4 hours PRN Temp greater or equal to 38.5C (101.3F)  acetaminophen   Tablet .. 650 milliGRAM(s) Oral every 6 hours PRN Mild Pain (1 - 3)  acetaminophen  Suppository .. 650 milliGRAM(s) Rectal every 4 hours PRN Temp greater or equal to 38.5C (101.3F)  ALBUTerol    90 MICROgram(s) HFA Inhaler 2 Puff(s) Inhalation every 4 hours PRN Shortness of Breath and/or Wheezing  ALPRAZolam 0.25 milliGRAM(s) Oral two times a day PRN anxiety  benzocaine 15 mG/menthol 3.6 mG (Sugar-Free) Lozenge 1 Lozenge Oral every 4 hours PRN Sore Throat  benzonatate 100 milliGRAM(s) Oral three times a day PRN Cough  guaifenesin/dextromethorphan  Syrup 10 milliLiter(s) Oral every 4 hours PRN Cough  ondansetron Injectable 4 milliGRAM(s) IV Push every 6 hours PRN Nausea and/or Vomiting  oxycodone    5 mG/acetaminophen 325 mG 1 Tablet(s) Oral every 4 hours PRN Severe Pain (7 - 10)      Allergies  No Known Allergies      Vital Signs Last 24 Hrs  T(C): 36.6 (03 May 2020 11:11), Max: 36.6 (02 May 2020 16:25)  T(F): 97.8 (03 May 2020 11:11), Max: 97.8 (02 May 2020 16:25)  HR: 89 (03 May 2020 11:11) (76 - 98)  BP: 123/70 (03 May 2020 11:11) (107/74 - 123/70)  BP(mean): --  RR: 19 (03 May 2020 11:11) (18 - 19)  SpO2: 95% (03 May 2020 11:11) (95% - 97%)    PHYSICAL EXAM:  GENERAL: NAD  HEAD:  Atraumatic  EYES: PERRLA  NERVOUS SYSTEM:  Awake, alert  CHEST/LUNG: Clear  HEART: RRR  ABDOMEN: Soft, no tender  EXTREMITIES:  no edema      LABS:                        13.3   3.00  )-----------( 204      ( 03 May 2020 08:10 )             39.8     05-03    140  |  108  |  9   ----------------------------<  97  3.8   |  26  |  1.10    Ca    8.5      03 May 2020 08:10    TPro  6.0  /  Alb  2.8<L>  /  TBili  0.4  /  DBili  x   /  AST  67<H>  /  ALT  217<H>  /  AlkPhos  45  05-03        CAPILLARY BLOOD GLUCOSE          RADIOLOGY & ADDITIONAL TESTS:    Imaging Personally Reviewed:  [ ] YES  [ ] NO    Consultant(s) Notes Reviewed:  [ ] YES  [ ] NO    Care Discussed with Consultants/Other Providers [ ] YES  [ ] NO

## 2020-05-04 ENCOUNTER — TRANSCRIPTION ENCOUNTER (OUTPATIENT)
Age: 35
End: 2020-05-04

## 2020-05-04 LAB
ANION GAP SERPL CALC-SCNC: 6 MMOL/L — SIGNIFICANT CHANGE UP (ref 5–17)
BUN SERPL-MCNC: 7 MG/DL — SIGNIFICANT CHANGE UP (ref 7–23)
CALCIUM SERPL-MCNC: 8.5 MG/DL — SIGNIFICANT CHANGE UP (ref 8.5–10.1)
CHLORIDE SERPL-SCNC: 109 MMOL/L — HIGH (ref 96–108)
CO2 SERPL-SCNC: 26 MMOL/L — SIGNIFICANT CHANGE UP (ref 22–31)
CREAT SERPL-MCNC: 0.95 MG/DL — SIGNIFICANT CHANGE UP (ref 0.5–1.3)
FERRITIN SERPL-MCNC: 295 NG/ML — SIGNIFICANT CHANGE UP (ref 30–400)
GLUCOSE SERPL-MCNC: 89 MG/DL — SIGNIFICANT CHANGE UP (ref 70–99)
HCT VFR BLD CALC: 40.7 % — SIGNIFICANT CHANGE UP (ref 39–50)
HGB BLD-MCNC: 12.9 G/DL — LOW (ref 13–17)
MCHC RBC-ENTMCNC: 28.6 PG — SIGNIFICANT CHANGE UP (ref 27–34)
MCHC RBC-ENTMCNC: 31.7 GM/DL — LOW (ref 32–36)
MCV RBC AUTO: 90.2 FL — SIGNIFICANT CHANGE UP (ref 80–100)
NRBC # BLD: 0 /100 WBCS — SIGNIFICANT CHANGE UP (ref 0–0)
PLATELET # BLD AUTO: 182 K/UL — SIGNIFICANT CHANGE UP (ref 150–400)
POTASSIUM SERPL-MCNC: 4 MMOL/L — SIGNIFICANT CHANGE UP (ref 3.5–5.3)
POTASSIUM SERPL-SCNC: 4 MMOL/L — SIGNIFICANT CHANGE UP (ref 3.5–5.3)
RBC # BLD: 4.51 M/UL — SIGNIFICANT CHANGE UP (ref 4.2–5.8)
RBC # FLD: 15 % — HIGH (ref 10.3–14.5)
SODIUM SERPL-SCNC: 141 MMOL/L — SIGNIFICANT CHANGE UP (ref 135–145)
WBC # BLD: 2.8 K/UL — LOW (ref 3.8–10.5)
WBC # FLD AUTO: 2.8 K/UL — LOW (ref 3.8–10.5)

## 2020-05-04 PROCEDURE — 99239 HOSP IP/OBS DSCHRG MGMT >30: CPT

## 2020-05-04 RX ORDER — ALPRAZOLAM 0.25 MG
1 TABLET ORAL
Qty: 3 | Refills: 0
Start: 2020-05-04 | End: 2020-05-06

## 2020-05-04 RX ADMIN — Medication 100 MILLIGRAM(S): at 20:57

## 2020-05-04 RX ADMIN — Medication 10 MILLILITER(S): at 14:46

## 2020-05-04 RX ADMIN — ENOXAPARIN SODIUM 40 MILLIGRAM(S): 100 INJECTION SUBCUTANEOUS at 17:51

## 2020-05-04 RX ADMIN — Medication 0.25 MILLIGRAM(S): at 21:53

## 2020-05-04 RX ADMIN — ENOXAPARIN SODIUM 40 MILLIGRAM(S): 100 INJECTION SUBCUTANEOUS at 04:30

## 2020-05-04 RX ADMIN — Medication 650 MILLIGRAM(S): at 04:28

## 2020-05-04 NOTE — DISCHARGE NOTE PROVIDER - NSDCCPCAREPLAN_GEN_ALL_CORE_FT
PRINCIPAL DISCHARGE DIAGNOSIS  Diagnosis: COVID-19  Assessment and Plan of Treatment: Stable now  Completed treatmenet and now O2 is ok      SECONDARY DISCHARGE DIAGNOSES  Diagnosis: Rash and nonspecific skin eruption  Assessment and Plan of Treatment: Rash and nonspecific skin eruption PRINCIPAL DISCHARGE DIAGNOSIS  Diagnosis: COVID-19  Assessment and Plan of Treatment: 35 y/o -American with COVID-19 still requiring 100 percent non rebreather but makers of inflammation slowly getting better.  PNA secondary to COVID-19:   - o2 sat 91-95 on RA at rest but will desaturate with ambulation. CM working on O2 delivery. HDS. will dc if able to have o2 delivered  - CXR: B/L multifocal PNA   - s/p Plaquenil, Reyataz, and tocilizumab    Acute hypoxics respiratory failure:  - only needs o2 with exertion   - s/p IV steroids.   Rash and nonspecific skin eruption:  - Cellulitis   - Resolved  - S/P 7 days of Augmentin  Anxiety:  - PRN xanax.  DVT pps:  -  Lovenox  Disposition: home.         SECONDARY DISCHARGE DIAGNOSES  Diagnosis: Rash and nonspecific skin eruption  Assessment and Plan of Treatment: Rash and nonspecific skin eruption

## 2020-05-04 NOTE — PROGRESS NOTE ADULT - ASSESSMENT
33 y/o -American with COVID-19 still requiring 100 percent non rebreather but makers of inflammation slowly getting better.      PNA secondary to COVID-19:   - Remains on NC, even though So2 improved, pt has panic attack and wants O2 back on. SO2 89 in RA today, insurance not approving home O2 will hold off DC.  - CXR: B/L multifocal PNA   - s/p Plaquenil, Reyataz, and tocilizumab    - cont w/ prone positioning  - Trend markers      Acute hypoxics respiratory failure:  - Now on nasal canula, will cont to titrate down   - s/p IV steroids.       Rash and nonspecific skin eruption:  - Cellulitis   - Resolved  - S/P 7 days of Augmentin    Anxiety:  - PRN xanax.      DVT pps:  -  Lovenox    Disposition: home.

## 2020-05-04 NOTE — DISCHARGE NOTE PROVIDER - NSDCMRMEDTOKEN_GEN_ALL_CORE_FT
benzonatate 100 mg oral capsule: 1 cap(s) orally 3 times a day, As Needed -Cough - for cough ALPRAZolam 0.25 mg oral tablet: 1 tab(s) orally once a day, As Needed -anxiety - for anxiety MDD:1  benzonatate 100 mg oral capsule: 1 cap(s) orally 3 times a day, As Needed -Cough - for cough ALPRAZolam 0.25 mg oral tablet: 1 tab(s) orally once a day, As Needed -anxiety - for anxiety MDD:1  benzonatate 100 mg oral capsule: 1 cap(s) orally 3 times a day, As Needed -Cough - for cough   NC via Concentrator at 2 LPM; SO2 in RA 83% at rest. : Dx: COVID-19 PNA

## 2020-05-04 NOTE — DISCHARGE NOTE NURSING/CASE MANAGEMENT/SOCIAL WORK - PATIENT PORTAL LINK FT
You can access the FollowMyHealth Patient Portal offered by [facility] by registering at the following website: [email or web address]. By joining [facility]’s FollowMyHealth portal, you will also be able to view your health information using other applications (apps) compatible with our system.

## 2020-05-04 NOTE — DISCHARGE NOTE PROVIDER - HOSPITAL COURSE
33 y/o -American with COVID-19 still requiring 100 percent non rebreather but makers of inflammation slowly getting better.            PNA secondary to COVID-19:     - Even though So2 improved, pt has panic attack and wants O2 back on. now on RA    - CXR: B/L multifocal PNA     - s/p Plaquenil, Reyataz, IV steroid, and tocilizumab      - cont w/ prone positioning    - Trend markers            Acute hypoxics respiratory failure:    - Now RA    - s/p IV steroids.             Rash and nonspecific skin eruption:    - Cellulitis     - Improving w/ ABx, S/P 7 days Augmentin        Anxiety:    - PRN xanax.            DVT pps:    -  Lovenox        Disposition: home 33 y/o -American with COVID-19 still requiring 100 percent non rebreather but makers of inflammation slowly getting better.            PNA secondary to COVID-19:     - Even though So2 improved, pt has panic attack and wants O2 back on.  SO2 88 in RA, will dc on NC    - CXR: B/L multifocal PNA     - s/p Plaquenil, Reyataz, IV steroid, and tocilizumab      - cont w/ prone positioning    - Trend markers            Acute hypoxics respiratory failure:    - SO2 88 in RA, will dc on NC    - s/p IV steroids.             Rash and nonspecific skin eruption:    - Cellulitis     - Improving w/ ABx, S/P 7 days Augmentin        Anxiety:    - PRN xanax.            DVT pps:    -  Lovenox        Disposition: home 33 y/o -American with COVID-19 still requiring 100 percent non rebreather but makers of inflammation slowly getting better.            PNA secondary to COVID-19:     - So2 ok in RA now    - CXR: B/L multifocal PNA     - s/p Plaquenil, Reyataz, IV steroid, and tocilizumab      - cont w/ prone positioning    - Trend markers            Acute hypoxics respiratory failure:    - So2 ok in RA now    - s/p IV steroids.             Rash and nonspecific skin eruption:    - Cellulitis     - Improving w/ ABx, S/P 7 days Augmentin        Anxiety:    - PRN xanax.            DVT pps:    -  Lovenox        Disposition: home 35 y/o -American with COVID-19 still requiring 100 percent non rebreather but makers of inflammation slowly getting better.            PNA secondary to COVID-19:     - o2 sat 91-95 on RA at rest but will desaturate with ambulation. CM working on O2 delivery. HDS. will dc if able to have o2 delivered    - CXR: B/L multifocal PNA     - s/p Plaquenil, Reyataz, and tocilizumab              Acute hypoxics respiratory failure:    - only needs o2 with exertion     - s/p IV steroids.             Rash and nonspecific skin eruption:    - Cellulitis     - Resolved    - S/P 7 days of Augmentin            Disposition: home.

## 2020-05-05 PROCEDURE — 99233 SBSQ HOSP IP/OBS HIGH 50: CPT

## 2020-05-05 RX ADMIN — Medication 100 MILLIGRAM(S): at 21:36

## 2020-05-05 RX ADMIN — ENOXAPARIN SODIUM 40 MILLIGRAM(S): 100 INJECTION SUBCUTANEOUS at 17:43

## 2020-05-05 RX ADMIN — Medication 10 MILLILITER(S): at 17:49

## 2020-05-05 RX ADMIN — BENZOCAINE AND MENTHOL 1 LOZENGE: 5; 1 LIQUID ORAL at 21:35

## 2020-05-05 RX ADMIN — ENOXAPARIN SODIUM 40 MILLIGRAM(S): 100 INJECTION SUBCUTANEOUS at 05:01

## 2020-05-05 RX ADMIN — Medication 0.25 MILLIGRAM(S): at 21:35

## 2020-05-05 NOTE — CHART NOTE - NSCHARTNOTEFT_GEN_A_CORE
Assessment:  Pt c PNA secondary to COVID-19, acute hypoxic respiratory failure, rash and nonspecific skin eruption, anxiety.   RD attempted to call pt, w/o answer.     Factors impacting intake: [x ] none [ ] nausea  [ ] vomiting [ ] diarrhea [ ] constipation  [ ]chewing problems [ ] swallowing issues  [ ] other:     Diet Prescription: Diet, Regular:   Supplement Feeding Modality:  Oral  Ensure Enlive Cans or Servings Per Day:  1       Frequency:  Daily (04-22-20 @ 16:35)    Intake: > 75%    Current Weight: 04/29/20, 83.9 kg, 04/24/20, 81.7 kg, c wt. gain of 2.2 kg   % Weight Change: 2.69%  No edema noted     Pertinent Medications: MEDICATIONS  (STANDING):  enoxaparin Injectable 40 milliGRAM(s) SubCutaneous every 12 hours    MEDICATIONS  (PRN):  acetaminophen   Tablet .. 650 milliGRAM(s) Oral every 4 hours PRN Temp greater or equal to 38.5C (101.3F)  acetaminophen   Tablet .. 650 milliGRAM(s) Oral every 6 hours PRN Mild Pain (1 - 3)  acetaminophen  Suppository .. 650 milliGRAM(s) Rectal every 4 hours PRN Temp greater or equal to 38.5C (101.3F)  ALBUTerol    90 MICROgram(s) HFA Inhaler 2 Puff(s) Inhalation every 4 hours PRN Shortness of Breath and/or Wheezing  ALPRAZolam 0.25 milliGRAM(s) Oral two times a day PRN anxiety  benzocaine 15 mG/menthol 3.6 mG (Sugar-Free) Lozenge 1 Lozenge Oral every 4 hours PRN Sore Throat  benzonatate 100 milliGRAM(s) Oral three times a day PRN Cough  guaifenesin/dextromethorphan  Syrup 10 milliLiter(s) Oral every 4 hours PRN Cough  ondansetron Injectable 4 milliGRAM(s) IV Push every 6 hours PRN Nausea and/or Vomiting    Pertinent Labs: 05-04 Na141 mmol/L Glu 89 mg/dL K+ 4.0 mmol/L Cr  0.95 mg/dL BUN 7 mg/dL 05-03 Alb 2.8 g/dL<L>     CAPILLARY BLOOD GLUCOSE        Skin: WDL    Estimated Needs:   [x ] no change since previous assessment (04/22/20)  [ ] recalculated:     Previous Nutrition Diagnosis:   No Diagnosis     New Nutrition Diagnosis: [x ] not applicable       Interventions:   Recommend  [x] Continue c current diet regimen   [ ] Change Diet To:  [ ] Nutrition Supplement  [ ] Nutrition Support  [ ] Other:     Monitoring and Evaluation:   [x ] PO intake [ x ] Tolerance to diet prescription [ x ] weights [ x ] labs[ x ] follow up per protocol  [ ] other:

## 2020-05-05 NOTE — PROGRESS NOTE ADULT - ASSESSMENT
33 y/o -American with COVID-19 still requiring 100 percent non rebreather but makers of inflammation slowly getting better.      PNA secondary to COVID-19:   - Remains on NC, even though So2 improved, pt has panic attack and wants O2 back on. SO2 83 in RA today, insurance not approving home O2,  will hold off DC. CM working on O2 prescription.  - If SO2 ok in 2l can dc after O2 is delivered.   - CXR: B/L multifocal PNA   - s/p Plaquenil, Reyataz, and tocilizumab    - cont w/ prone positioning  - Trend markers      Acute hypoxics respiratory failure:  - Now on nasal canula, will cont to titrate down   - s/p IV steroids.       Rash and nonspecific skin eruption:  - Cellulitis   - Resolved  - S/P 7 days of Augmentin    Anxiety:  - PRN xanax.      DVT pps:  -  Lovenox    Disposition: home.

## 2020-05-05 NOTE — PROGRESS NOTE ADULT - SUBJECTIVE AND OBJECTIVE BOX
Patient is a 34y old  Male who presents with a chief complaint of Covid-19 (04 May 2020 15:40)      INTERVAL HPI/OVERNIGHT EVENTS:  Pt was seen and examined, no acute events.    MEDICATIONS  (STANDING):  enoxaparin Injectable 40 milliGRAM(s) SubCutaneous every 12 hours    MEDICATIONS  (PRN):  acetaminophen   Tablet .. 650 milliGRAM(s) Oral every 4 hours PRN Temp greater or equal to 38.5C (101.3F)  acetaminophen   Tablet .. 650 milliGRAM(s) Oral every 6 hours PRN Mild Pain (1 - 3)  acetaminophen  Suppository .. 650 milliGRAM(s) Rectal every 4 hours PRN Temp greater or equal to 38.5C (101.3F)  ALBUTerol    90 MICROgram(s) HFA Inhaler 2 Puff(s) Inhalation every 4 hours PRN Shortness of Breath and/or Wheezing  ALPRAZolam 0.25 milliGRAM(s) Oral two times a day PRN anxiety  benzocaine 15 mG/menthol 3.6 mG (Sugar-Free) Lozenge 1 Lozenge Oral every 4 hours PRN Sore Throat  benzonatate 100 milliGRAM(s) Oral three times a day PRN Cough  guaifenesin/dextromethorphan  Syrup 10 milliLiter(s) Oral every 4 hours PRN Cough  ondansetron Injectable 4 milliGRAM(s) IV Push every 6 hours PRN Nausea and/or Vomiting      Allergies  No Known Allergies      Vital Signs Last 24 Hrs  T(C): 36.9 (05 May 2020 12:18), Max: 36.9 (05 May 2020 12:18)  T(F): 98.4 (05 May 2020 12:18), Max: 98.4 (05 May 2020 12:18)  HR: 99 (05 May 2020 12:18) (87 - 110)  BP: 120/72 (05 May 2020 12:18) (114/57 - 128/69)  BP(mean): --  RR: 20 (05 May 2020 12:18) (17 - 20)  SpO2: 93% (05 May 2020 12:18) (91% - 95%)      PHYSICAL EXAM:  GENERAL: NAD  HEAD:  Atraumatic   EYES: PERRLA  NERVOUS SYSTEM:  A, O x 3, non focal  CHEST/LUNG: Clear  HEART: RRR  ABDOMEN: Soft, non tender  EXTREMITIES:  no edema      LABS:                        12.9   2.80  )-----------( 182      ( 04 May 2020 07:56 )             40.7     05-04    141  |  109<H>  |  7   ----------------------------<  89  4.0   |  26  |  0.95    Ca    8.5      04 May 2020 07:56          CAPILLARY BLOOD GLUCOSE          RADIOLOGY & ADDITIONAL TESTS:    Imaging Personally Reviewed:  [ ] YES  [ ] NO    Consultant(s) Notes Reviewed:  [ ] YES  [ ] NO    Care Discussed with Consultants/Other Providers [ ] YES  [ ] NO

## 2020-05-06 PROCEDURE — 99232 SBSQ HOSP IP/OBS MODERATE 35: CPT

## 2020-05-06 RX ADMIN — Medication 0.25 MILLIGRAM(S): at 23:11

## 2020-05-06 RX ADMIN — BENZOCAINE AND MENTHOL 1 LOZENGE: 5; 1 LIQUID ORAL at 23:11

## 2020-05-06 RX ADMIN — Medication 10 MILLILITER(S): at 23:10

## 2020-05-06 RX ADMIN — ENOXAPARIN SODIUM 40 MILLIGRAM(S): 100 INJECTION SUBCUTANEOUS at 17:56

## 2020-05-06 RX ADMIN — Medication 0.25 MILLIGRAM(S): at 11:28

## 2020-05-06 RX ADMIN — Medication 10 MILLILITER(S): at 11:28

## 2020-05-06 RX ADMIN — ENOXAPARIN SODIUM 40 MILLIGRAM(S): 100 INJECTION SUBCUTANEOUS at 05:02

## 2020-05-06 NOTE — PROGRESS NOTE ADULT - SUBJECTIVE AND OBJECTIVE BOX
Patient is a 34y old  Male who presents with a chief complaint of Covid-19 (04 May 2020 15:40)      INTERVAL HPI/OVERNIGHT EVENTS:  Pt was seen and examined, no acute events.    MEDICATIONS  (STANDING):  enoxaparin Injectable 40 milliGRAM(s) SubCutaneous every 12 hours    MEDICATIONS  (PRN):  acetaminophen   Tablet .. 650 milliGRAM(s) Oral every 4 hours PRN Temp greater or equal to 38.5C (101.3F)  acetaminophen   Tablet .. 650 milliGRAM(s) Oral every 6 hours PRN Mild Pain (1 - 3)  acetaminophen  Suppository .. 650 milliGRAM(s) Rectal every 4 hours PRN Temp greater or equal to 38.5C (101.3F)  ALBUTerol    90 MICROgram(s) HFA Inhaler 2 Puff(s) Inhalation every 4 hours PRN Shortness of Breath and/or Wheezing  ALPRAZolam 0.25 milliGRAM(s) Oral two times a day PRN anxiety  benzocaine 15 mG/menthol 3.6 mG (Sugar-Free) Lozenge 1 Lozenge Oral every 4 hours PRN Sore Throat  benzonatate 100 milliGRAM(s) Oral three times a day PRN Cough  guaifenesin/dextromethorphan  Syrup 10 milliLiter(s) Oral every 4 hours PRN Cough  ondansetron Injectable 4 milliGRAM(s) IV Push every 6 hours PRN Nausea and/or Vomiting      Allergies  No Known Allergies      Vital Signs Last 24 Hrs  T(C): 37.8 (06 May 2020 12:45), Max: 37.8 (06 May 2020 12:45)  T(F): 100.1 (06 May 2020 12:45), Max: 100.1 (06 May 2020 12:45)  HR: 97 (06 May 2020 12:45) (95 - 101)  BP: 116/70 (06 May 2020 12:45) (112/74 - 121/85)  BP(mean): --  RR: 18 (06 May 2020 12:45) (17 - 19)  SpO2: 95% (06 May 2020 12:45) (91% - 95%)    PHYSICAL EXAM:  GENERAL: NAD  HEAD:  Atraumatic   EYES: PERRLA  NERVOUS SYSTEM:  A, O x 3, non focal  CHEST/LUNG: Clear  HEART: RRR  ABDOMEN: Soft, non tender  EXTREMITIES:  no edema      LABS:                            CAPILLARY BLOOD GLUCOSE          RADIOLOGY & ADDITIONAL TESTS:    Imaging Personally Reviewed:  [ ] YES  [ ] NO    Consultant(s) Notes Reviewed:  [ ] YES  [ ] NO    Care Discussed with Consultants/Other Providers [ ] YES  [ ] NO

## 2020-05-06 NOTE — PROGRESS NOTE ADULT - ASSESSMENT
33 y/o -American with COVID-19 still requiring 100 percent non rebreather but makers of inflammation slowly getting better.      PNA secondary to COVID-19:   - o2 sat 91-95 on RA at rest but will desaturate with ambulation. CM working on O2 prescription. HDS. will dc if able to have o2 delivered   - CXR: B/L multifocal PNA   - s/p Plaquenil, Reyataz, and tocilizumab    - cont w/ prone positioning  - Trend markers      Acute hypoxics respiratory failure:  - Now on nasal canula, will cont to titrate down   - s/p IV steroids.       Rash and nonspecific skin eruption:  - Cellulitis   - Resolved  - S/P 7 days of Augmentin    Anxiety:  - PRN xanax.      DVT pps:  -  Lovenox    Disposition: home.

## 2020-05-07 VITALS
DIASTOLIC BLOOD PRESSURE: 72 MMHG | RESPIRATION RATE: 18 BRPM | OXYGEN SATURATION: 96 % | SYSTOLIC BLOOD PRESSURE: 118 MMHG | HEART RATE: 96 BPM

## 2020-05-07 LAB
ANION GAP SERPL CALC-SCNC: 8 MMOL/L — SIGNIFICANT CHANGE UP (ref 5–17)
BUN SERPL-MCNC: 7 MG/DL — SIGNIFICANT CHANGE UP (ref 7–23)
CALCIUM SERPL-MCNC: 8.2 MG/DL — LOW (ref 8.5–10.1)
CHLORIDE SERPL-SCNC: 111 MMOL/L — HIGH (ref 96–108)
CO2 SERPL-SCNC: 25 MMOL/L — SIGNIFICANT CHANGE UP (ref 22–31)
CREAT SERPL-MCNC: 1.15 MG/DL — SIGNIFICANT CHANGE UP (ref 0.5–1.3)
D DIMER BLD IA.RAPID-MCNC: <150 NG/ML DDU — SIGNIFICANT CHANGE UP
GLUCOSE SERPL-MCNC: 93 MG/DL — SIGNIFICANT CHANGE UP (ref 70–99)
HCT VFR BLD CALC: 39.3 % — SIGNIFICANT CHANGE UP (ref 39–50)
HGB BLD-MCNC: 13.2 G/DL — SIGNIFICANT CHANGE UP (ref 13–17)
MCHC RBC-ENTMCNC: 29.7 PG — SIGNIFICANT CHANGE UP (ref 27–34)
MCHC RBC-ENTMCNC: 33.6 GM/DL — SIGNIFICANT CHANGE UP (ref 32–36)
MCV RBC AUTO: 88.3 FL — SIGNIFICANT CHANGE UP (ref 80–100)
NRBC # BLD: 0 /100 WBCS — SIGNIFICANT CHANGE UP (ref 0–0)
PLATELET # BLD AUTO: 154 K/UL — SIGNIFICANT CHANGE UP (ref 150–400)
POTASSIUM SERPL-MCNC: 3.9 MMOL/L — SIGNIFICANT CHANGE UP (ref 3.5–5.3)
POTASSIUM SERPL-SCNC: 3.9 MMOL/L — SIGNIFICANT CHANGE UP (ref 3.5–5.3)
PROCALCITONIN SERPL-MCNC: 0.02 NG/ML — SIGNIFICANT CHANGE UP (ref 0.02–0.1)
RBC # BLD: 4.45 M/UL — SIGNIFICANT CHANGE UP (ref 4.2–5.8)
RBC # FLD: 15.3 % — HIGH (ref 10.3–14.5)
SODIUM SERPL-SCNC: 144 MMOL/L — SIGNIFICANT CHANGE UP (ref 135–145)
WBC # BLD: 3.12 K/UL — LOW (ref 3.8–10.5)
WBC # FLD AUTO: 3.12 K/UL — LOW (ref 3.8–10.5)

## 2020-05-07 PROCEDURE — 99233 SBSQ HOSP IP/OBS HIGH 50: CPT

## 2020-05-07 RX ADMIN — Medication 0.25 MILLIGRAM(S): at 10:37

## 2020-05-07 RX ADMIN — Medication 100 MILLIGRAM(S): at 18:45

## 2020-05-07 RX ADMIN — BENZOCAINE AND MENTHOL 1 LOZENGE: 5; 1 LIQUID ORAL at 11:26

## 2020-05-07 RX ADMIN — ENOXAPARIN SODIUM 40 MILLIGRAM(S): 100 INJECTION SUBCUTANEOUS at 05:23

## 2020-05-07 RX ADMIN — Medication 100 MILLIGRAM(S): at 10:37

## 2020-05-07 RX ADMIN — ENOXAPARIN SODIUM 40 MILLIGRAM(S): 100 INJECTION SUBCUTANEOUS at 17:02

## 2020-05-07 NOTE — PROGRESS NOTE ADULT - REASON FOR ADMISSION
Covid
Covid-19
Covid

## 2020-05-07 NOTE — PROGRESS NOTE ADULT - ASSESSMENT
33 y/o -American with COVID-19 still requiring 100 percent non rebreather but makers of inflammation slowly getting better.      PNA secondary to COVID-19:   - o2 sat 91-95 on RA at rest but will desaturate with ambulation. CM working on O2 delivery. HDS. will dc if able to have o2 delivered  - CXR: B/L multifocal PNA   - s/p Plaquenil, Reyataz, and tocilizumab        Acute hypoxics respiratory failure:  - only needs o2 with exertion   - s/p IV steroids.       Rash and nonspecific skin eruption:  - Cellulitis   - Resolved  - S/P 7 days of Augmentin    Anxiety:  - PRN xanax.      DVT pps:  -  Lovenox    Disposition: home.

## 2020-05-07 NOTE — PROGRESS NOTE ADULT - SUBJECTIVE AND OBJECTIVE BOX
Patient is a 34y old  Male who presents with a chief complaint of Covid-19 (04 May 2020 15:40)      INTERVAL HPI/OVERNIGHT EVENTS:  Pt was seen and examined, no acute events.    MEDICATIONS  (STANDING):  enoxaparin Injectable 40 milliGRAM(s) SubCutaneous every 12 hours    MEDICATIONS  (PRN):  acetaminophen   Tablet .. 650 milliGRAM(s) Oral every 4 hours PRN Temp greater or equal to 38.5C (101.3F)  acetaminophen   Tablet .. 650 milliGRAM(s) Oral every 6 hours PRN Mild Pain (1 - 3)  acetaminophen  Suppository .. 650 milliGRAM(s) Rectal every 4 hours PRN Temp greater or equal to 38.5C (101.3F)  ALBUTerol    90 MICROgram(s) HFA Inhaler 2 Puff(s) Inhalation every 4 hours PRN Shortness of Breath and/or Wheezing  ALPRAZolam 0.25 milliGRAM(s) Oral two times a day PRN anxiety  benzocaine 15 mG/menthol 3.6 mG (Sugar-Free) Lozenge 1 Lozenge Oral every 4 hours PRN Sore Throat  benzonatate 100 milliGRAM(s) Oral three times a day PRN Cough  guaifenesin/dextromethorphan  Syrup 10 milliLiter(s) Oral every 4 hours PRN Cough  ondansetron Injectable 4 milliGRAM(s) IV Push every 6 hours PRN Nausea and/or Vomiting        Allergies  No Known Allergies      Vital Signs Last 24 Hrs  T(C): 36.3 (07 May 2020 12:37), Max: 37 (06 May 2020 16:58)  T(F): 97.4 (07 May 2020 12:37), Max: 98.6 (06 May 2020 16:58)  HR: 91 (07 May 2020 12:37) (85 - 96)  BP: 113/78 (07 May 2020 12:37) (113/78 - 117/71)  BP(mean): --  RR: 18 (07 May 2020 12:37) (17 - 19)  SpO2: 98% (07 May 2020 12:37) (90% - 98%)    PHYSICAL EXAM:  GENERAL: NAD  HEAD:  Atraumatic   EYES: PERRLA  NERVOUS SYSTEM:  A, O x 3, non focal  CHEST/LUNG: Clear  HEART: RRR  ABDOMEN: Soft, non tender  EXTREMITIES:  no edema      LABS:                            CAPILLARY BLOOD GLUCOSE          RADIOLOGY & ADDITIONAL TESTS:    Imaging Personally Reviewed:  [ ] YES  [ ] NO    Consultant(s) Notes Reviewed:  [ ] YES  [ ] NO    Care Discussed with Consultants/Other Providers [ ] YES  [ ] NO

## 2020-05-13 DIAGNOSIS — U07.1 COVID-19: ICD-10-CM

## 2020-05-13 DIAGNOSIS — L03.818 CELLULITIS OF OTHER SITES: ICD-10-CM

## 2020-05-13 DIAGNOSIS — J12.89 OTHER VIRAL PNEUMONIA: ICD-10-CM

## 2020-05-13 DIAGNOSIS — F41.9 ANXIETY DISORDER, UNSPECIFIED: ICD-10-CM

## 2020-05-13 DIAGNOSIS — J96.01 ACUTE RESPIRATORY FAILURE WITH HYPOXIA: ICD-10-CM

## 2020-05-13 DIAGNOSIS — R06.02 SHORTNESS OF BREATH: ICD-10-CM

## 2020-09-18 NOTE — ED ADULT TRIAGE NOTE - NS ED NURSE DIRECT TO ROOM YN
Yes
PAST MEDICAL HISTORY:  Acid reflux disease     Allergic rhinitis, unspecified allergic rhinitis type     HLD (hyperlipidemia)     HTN (hypertension)

## 2022-08-18 ENCOUNTER — APPOINTMENT (OUTPATIENT)
Dept: UROLOGY | Facility: CLINIC | Age: 37
End: 2022-08-18

## 2022-08-26 ENCOUNTER — NON-APPOINTMENT (OUTPATIENT)
Age: 37
End: 2022-08-26

## 2022-08-26 ENCOUNTER — APPOINTMENT (OUTPATIENT)
Dept: UROLOGY | Facility: CLINIC | Age: 37
End: 2022-08-26

## 2022-08-26 VITALS
OXYGEN SATURATION: 98 % | TEMPERATURE: 96.2 F | WEIGHT: 223 LBS | HEART RATE: 105 BPM | BODY MASS INDEX: 31.22 KG/M2 | SYSTOLIC BLOOD PRESSURE: 131 MMHG | HEIGHT: 71 IN | DIASTOLIC BLOOD PRESSURE: 90 MMHG

## 2022-08-26 DIAGNOSIS — Z78.9 OTHER SPECIFIED HEALTH STATUS: ICD-10-CM

## 2022-08-26 DIAGNOSIS — Z87.891 PERSONAL HISTORY OF NICOTINE DEPENDENCE: ICD-10-CM

## 2022-08-26 PROCEDURE — 99204 OFFICE O/P NEW MOD 45 MIN: CPT

## 2022-08-26 NOTE — HISTORY OF PRESENT ILLNESS
[Currently Experiencing ___] :  [unfilled] [None] : None [FreeTextEntry1] : Mr. Roche is a very pleasant 37 year old man here today for bilateral scrotal edema.\par He denies lower extremity edema\par He reports that this has been going on for the past couple of months.\par Reports that he was previously seeing Dr. Matthew and was referred by him.\par Reports that there is no pain or discomfort.\par Reports that his PCP put him on 20 mg furosemide which helped with the swelling.\par Scrotal US showed diffuse scrotal edema and 4 mm right varicocele.\par Otherwise unremarkable.\par Denies any dysuria, hematuria or difficulty voiding.\par Denies any personal or family history of kidney stones.\par Denies any family history of urological cancers.\par Former smoker, quit 1 year ago.

## 2022-08-26 NOTE — PHYSICAL EXAM
[General Appearance - Well Developed] : well developed [General Appearance - Well Nourished] : well nourished [Normal Appearance] : normal appearance [Well Groomed] : well groomed [General Appearance - In No Acute Distress] : no acute distress [Edema] : no peripheral edema [Respiration, Rhythm And Depth] : normal respiratory rhythm and effort [Exaggerated Use Of Accessory Muscles For Inspiration] : no accessory muscle use [Abdomen Soft] : soft [Abdomen Tenderness] : non-tender [Costovertebral Angle Tenderness] : no ~M costovertebral angle tenderness [Normal Station and Gait] : the gait and station were normal for the patient's age [] : no rash [No Focal Deficits] : no focal deficits [Oriented To Time, Place, And Person] : oriented to person, place, and time [Affect] : the affect was normal [Mood] : the mood was normal [Not Anxious] : not anxious [No Palpable Adenopathy] : no palpable adenopathy [Urethral Meatus] : meatus normal [Urinary Bladder Findings] : the bladder was normal on palpation [Testes Mass (___cm)] : there were no testicular masses [FreeTextEntry1] : bilateral scrotal edema.

## 2022-08-26 NOTE — ASSESSMENT
[FreeTextEntry1] : Mr. Roche is a very pleasant 37 year old man here today for bilateral scrotal edema.\par Continue 20 mg furosemide.  I suggested increasing the dose of the medication if okay to do so with his primary care physician\par We discussed potential etiologies of scrotal edema\par BMP.\par UC.\par UA.\par CT abdomen and pelvis.\par RTO in 2 weeks.

## 2022-08-27 LAB
ANION GAP SERPL CALC-SCNC: 14 MMOL/L
APPEARANCE: CLEAR
BACTERIA: NEGATIVE
BILIRUBIN URINE: NEGATIVE
BLOOD URINE: NEGATIVE
BUN SERPL-MCNC: 9 MG/DL
CALCIUM SERPL-MCNC: 9.9 MG/DL
CHLORIDE SERPL-SCNC: 107 MMOL/L
CO2 SERPL-SCNC: 22 MMOL/L
COLOR: COLORLESS
CREAT SERPL-MCNC: 1.25 MG/DL
EGFR: 77 ML/MIN/1.73M2
GLUCOSE QUALITATIVE U: NEGATIVE
GLUCOSE SERPL-MCNC: 92 MG/DL
HYALINE CASTS: 0 /LPF
KETONES URINE: NEGATIVE
LEUKOCYTE ESTERASE URINE: NEGATIVE
MICROSCOPIC-UA: NORMAL
NITRITE URINE: NEGATIVE
PH URINE: 6
POTASSIUM SERPL-SCNC: 4.3 MMOL/L
PROTEIN URINE: NEGATIVE
RED BLOOD CELLS URINE: 0 /HPF
SODIUM SERPL-SCNC: 143 MMOL/L
SPECIFIC GRAVITY URINE: 1.01
SQUAMOUS EPITHELIAL CELLS: 0 /HPF
UROBILINOGEN URINE: NORMAL
WHITE BLOOD CELLS URINE: 1 /HPF

## 2022-08-28 LAB — BACTERIA UR CULT: NORMAL

## 2022-09-09 ENCOUNTER — APPOINTMENT (OUTPATIENT)
Dept: UROLOGY | Facility: CLINIC | Age: 37
End: 2022-09-09

## 2022-10-14 ENCOUNTER — APPOINTMENT (OUTPATIENT)
Dept: UROLOGY | Facility: CLINIC | Age: 37
End: 2022-10-14

## 2022-10-14 PROCEDURE — 99213 OFFICE O/P EST LOW 20 MIN: CPT | Mod: 95

## 2022-10-14 NOTE — ASSESSMENT
[FreeTextEntry1] : Very pleasant 37-year-old gentleman who presents for follow-up of bilateral scrotal edema, hydroceles\par -CT images reviewed demonstrating scrotal wall edema and bilateral hydroceles. \par -Incidentally noted on CT scan was also advanced emphysema.  He reports after COVID he had a pneumothorax.  His primary care physician is aware of emphysema.\par -Continue Lasix 20 mg\par -I suggested that he follow-up with a cardiologist and I have provided him with a recommendation to do so\par -Follow-up as needed

## 2022-10-14 NOTE — PHYSICAL EXAM
[General Appearance - Well Developed] : well developed [General Appearance - Well Nourished] : well nourished [Normal Appearance] : normal appearance [Well Groomed] : well groomed [General Appearance - In No Acute Distress] : no acute distress [] : no respiratory distress [Exaggerated Use Of Accessory Muscles For Inspiration] : no accessory muscle use [Respiration, Rhythm And Depth] : normal respiratory rhythm and effort [Oriented To Time, Place, And Person] : oriented to person, place, and time [Affect] : the affect was normal [Mood] : the mood was normal [Not Anxious] : not anxious

## 2022-10-14 NOTE — HISTORY OF PRESENT ILLNESS
[FreeTextEntry1] : Very pleasant 37-year-old gentleman presents for follow-up of bilateral scrotal edema, bilateral hydroceles.  He reports that scrotal edema is improved when he uses furosemide.  If he stops using furosemide he reports that scrotal edema worsens.  He continues to take furosemide 20 mg which has improved the scrotal swelling significantly.  He recently underwent a CT scan which demonstrated significant bilateral scrotal wall edema and bilateral hydroceles.  He is very bothered by this.

## 2022-10-25 ENCOUNTER — APPOINTMENT (OUTPATIENT)
Dept: UROLOGY | Facility: CLINIC | Age: 37
End: 2022-10-25

## 2022-11-16 ENCOUNTER — APPOINTMENT (OUTPATIENT)
Dept: CARDIOLOGY | Facility: CLINIC | Age: 37
End: 2022-11-16
Payer: MEDICAID

## 2022-11-16 ENCOUNTER — APPOINTMENT (OUTPATIENT)
Dept: CARDIOLOGY | Facility: CLINIC | Age: 37
End: 2022-11-16

## 2022-11-16 ENCOUNTER — NON-APPOINTMENT (OUTPATIENT)
Age: 37
End: 2022-11-16

## 2022-11-16 VITALS
OXYGEN SATURATION: 98 % | TEMPERATURE: 97.9 F | BODY MASS INDEX: 32.06 KG/M2 | WEIGHT: 229 LBS | DIASTOLIC BLOOD PRESSURE: 80 MMHG | SYSTOLIC BLOOD PRESSURE: 128 MMHG | HEART RATE: 82 BPM | HEIGHT: 71 IN

## 2022-11-16 PROCEDURE — 93306 TTE W/DOPPLER COMPLETE: CPT

## 2022-11-16 PROCEDURE — 93000 ELECTROCARDIOGRAM COMPLETE: CPT

## 2022-11-16 PROCEDURE — 99213 OFFICE O/P EST LOW 20 MIN: CPT | Mod: 25

## 2022-11-17 NOTE — HISTORY OF PRESENT ILLNESS
[FreeTextEntry1] : He is a pleasant 37-year-old gentleman with worsening scrotal edema and as part of urologic work-up he was seen to have bilateral hydrocele but this is not felt to be related to his edema.  He has been prescribed Lasix 20 mg daily as needed with benefit but is concerned and is here for cardiac assessment.  BMI is 31 kg/m² he denies any other significant past medical or surgical history.  Former tobacco smoker.  On review of his available medical data there is a suggestion of emphysema.  No current chest pains, shortness of breath, palpitations syncope or edema elsewhere.

## 2022-11-17 NOTE — PHYSICAL EXAM
[Well Developed] : well developed [Well Nourished] : well nourished [No Acute Distress] : no acute distress [Normal Conjunctiva] : normal conjunctiva [Normal Venous Pressure] : normal venous pressure [No Carotid Bruit] : no carotid bruit [Normal S1, S2] : normal S1, S2 [No Murmur] : no murmur [No Rub] : no rub [No Gallop] : no gallop [Clear Lung Fields] : clear lung fields [Good Air Entry] : good air entry [No Respiratory Distress] : no respiratory distress  [Soft] : abdomen soft [Non Tender] : non-tender [No Masses/organomegaly] : no masses/organomegaly [Normal Bowel Sounds] : normal bowel sounds [Normal Gait] : normal gait [No Edema] : no edema [No Cyanosis] : no cyanosis [No Clubbing] : no clubbing [No Varicosities] : no varicosities [Edema ___] : edema [unfilled] [No Rash] : no rash [No Skin Lesions] : no skin lesions [Moves all extremities] : moves all extremities [No Focal Deficits] : no focal deficits [Normal Speech] : normal speech [Alert and Oriented] : alert and oriented [Normal memory] : normal memory

## 2022-11-17 NOTE — DISCUSSION/SUMMARY
[___ Month(s)] : in [unfilled] month(s) [FreeTextEntry1] : Changing Lasix over to torsemide 20 mg once daily and these advised to avoid use of Lasix now.  Echo ordered to assess LV function and valvular status which, performed after this exam today, reveals preserved biventricular size and systolic function and no significant valvular heart disease.  Follow-up with you for care 1 month or sooner if needed.  He will have ongoing urologic follow-up as well.

## 2022-12-13 RX ORDER — TORSEMIDE 20 MG/1
20 TABLET ORAL
Qty: 90 | Refills: 3 | Status: ACTIVE | COMMUNITY
Start: 2022-11-16 | End: 1900-01-01

## 2022-12-20 ENCOUNTER — APPOINTMENT (OUTPATIENT)
Dept: CARDIOLOGY | Facility: CLINIC | Age: 37
End: 2022-12-20

## 2023-01-11 ENCOUNTER — NON-APPOINTMENT (OUTPATIENT)
Age: 38
End: 2023-01-11

## 2023-01-11 ENCOUNTER — APPOINTMENT (OUTPATIENT)
Dept: CARDIOLOGY | Facility: CLINIC | Age: 38
End: 2023-01-11
Payer: MEDICAID

## 2023-01-11 VITALS
DIASTOLIC BLOOD PRESSURE: 90 MMHG | TEMPERATURE: 97.9 F | WEIGHT: 231 LBS | BODY MASS INDEX: 32.34 KG/M2 | OXYGEN SATURATION: 98 % | HEART RATE: 82 BPM | SYSTOLIC BLOOD PRESSURE: 120 MMHG | HEIGHT: 71 IN

## 2023-01-11 PROCEDURE — 99213 OFFICE O/P EST LOW 20 MIN: CPT

## 2023-01-11 RX ORDER — POTASSIUM CHLORIDE 1500 MG/1
20 TABLET, FILM COATED, EXTENDED RELEASE ORAL
Qty: 90 | Refills: 3 | Status: ACTIVE | COMMUNITY
Start: 2023-01-11 | End: 1900-01-01

## 2023-01-11 NOTE — HISTORY OF PRESENT ILLNESS
[FreeTextEntry1] : He is a pleasant 37-year-old following up with bilateral hydrocele and ongoing scrotal edema which was previously responsive to Lasix.  Possibly less so and he was switched to torsemide, however despite using this medication, he felt that he had a better response to higher dose Lasix for scrotal edema reduction.  He is quite bothered by the fact that he has ongoing idiopathic scrotal edema.  \par \par Just before the Christmas holiday he went to [facility] for an urgent evaluation and he reports imaging showed nothing acute.  His recent cardiac work-up was unrevealing as his ECG and echo were within normal limits, therefore there is no evidence of heart failure as a possible contributor to localized edema.  I think this is more of a urologic problem out of my scope of practice.

## 2023-01-11 NOTE — DISCUSSION/SUMMARY
[With ___] : with [unfilled] [FreeTextEntry1] : Stop torsemide and take Lasix 40 mg 1 to 2 tablets daily as needed for scrotal edema reduction.  I have given him a potassium 20 meq daily pill if needed while on Lasix therapy.  I have taken the liberty of given him another urological referral for another opinion as I believe this is out of my scope of practice and I am wondering if his bilateral hydrocele or other process might be more of a concern given that there is no clear evidence of heart failure with a negative cardiac work-up thus far.  He is advised to obtain his latest imaging reports from [facility] and have follow-up labs and outpatient urological evaluation recommended along with heart healthy diet exercise regimen.  He could see me in 3 to 4 months if needed.

## 2023-01-31 ENCOUNTER — APPOINTMENT (OUTPATIENT)
Dept: UROLOGY | Facility: CLINIC | Age: 38
End: 2023-01-31
Payer: MEDICAID

## 2023-01-31 VITALS
OXYGEN SATURATION: 96 % | BODY MASS INDEX: 32.2 KG/M2 | SYSTOLIC BLOOD PRESSURE: 125 MMHG | TEMPERATURE: 96 F | DIASTOLIC BLOOD PRESSURE: 82 MMHG | HEIGHT: 71 IN | HEART RATE: 100 BPM | WEIGHT: 230 LBS

## 2023-01-31 PROCEDURE — 99213 OFFICE O/P EST LOW 20 MIN: CPT

## 2023-01-31 NOTE — PHYSICAL EXAM
[General Appearance - Well Developed] : well developed [General Appearance - Well Nourished] : well nourished [Normal Appearance] : normal appearance [Well Groomed] : well groomed [General Appearance - In No Acute Distress] : no acute distress [Abdomen Soft] : soft [Abdomen Tenderness] : non-tender [Costovertebral Angle Tenderness] : no ~M costovertebral angle tenderness [Edema] : no peripheral edema [] : no respiratory distress [Respiration, Rhythm And Depth] : normal respiratory rhythm and effort [Exaggerated Use Of Accessory Muscles For Inspiration] : no accessory muscle use [Oriented To Time, Place, And Person] : oriented to person, place, and time [Affect] : the affect was normal [Mood] : the mood was normal [Not Anxious] : not anxious [Normal Station and Gait] : the gait and station were normal for the patient's age [No Focal Deficits] : no focal deficits [No Palpable Adenopathy] : no palpable adenopathy [FreeTextEntry1] : Bilateral scrotal wall edema

## 2023-01-31 NOTE — ASSESSMENT
[FreeTextEntry1] : Mr. Roche is a very pleasant 37 year old man here today for history of scrotal edema.\par Reports that he has been okay since he was here last.\par Reports that his cardiologist cleared him of cardiac etiologies of scrotal swelling.\par Reports he was placed on higher does of furosemide and it may have helped slightly.\par He reports this is still bothersome.\par Denies any hematuria or dysuria.\par Scrotal US previously demonstrates diffuse scrotal edema\par CT scan showed marked scrotal wall edema and moderate bilateral hydroceles\par PE shows bilateral edematous scrotum; semi improved from previous exam.\par We discussed options for management moving forward, including bilateral hydrocelectomy, hydrocele aspiration, medical management of scrotal edema, and scrotal reconstruction.  We discussed that hydrocele aspiration is unlikely to be successful and will likely recur.  We discussed that a bilateral hydrocelectomy will not address marked scrotal wall edema and redundant scrotal skin tissue.\par I have recommended that he follow-up with a specialist in reconstructive urology to discuss options for management of scrotal reconstruction and I have provided him with a reference to do so

## 2023-01-31 NOTE — HISTORY OF PRESENT ILLNESS
[None] : None [FreeTextEntry1] : Mr. Roche is a very pleasant 37 year old man here today for history of scrotal edema.\par Reports that he has been okay since he was here last.\par Reports that his cardiologist cleared him of cardiac etiologies of scrotal edema.\par Reports he was placed on higher does of furosemide and it may have helped slightly.\par He reports this is still bothersome.\par Denies any hematuria or dysuria.

## 2023-02-02 ENCOUNTER — RX RENEWAL (OUTPATIENT)
Age: 38
End: 2023-02-02

## 2023-02-02 RX ORDER — FUROSEMIDE 40 MG/1
40 TABLET ORAL
Qty: 180 | Refills: 2 | Status: ACTIVE | COMMUNITY
Start: 2022-08-26 | End: 1900-01-01

## 2023-02-16 ENCOUNTER — APPOINTMENT (OUTPATIENT)
Dept: UROLOGY | Facility: CLINIC | Age: 38
End: 2023-02-16
Payer: MEDICAID

## 2023-02-16 VITALS
TEMPERATURE: 97.2 F | SYSTOLIC BLOOD PRESSURE: 137 MMHG | BODY MASS INDEX: 32.02 KG/M2 | DIASTOLIC BLOOD PRESSURE: 90 MMHG | HEART RATE: 93 BPM | RESPIRATION RATE: 18 BRPM | WEIGHT: 229.6 LBS | OXYGEN SATURATION: 97 %

## 2023-02-16 DIAGNOSIS — N52.9 MALE ERECTILE DYSFUNCTION, UNSPECIFIED: ICD-10-CM

## 2023-02-16 DIAGNOSIS — N43.3 HYDROCELE, UNSPECIFIED: ICD-10-CM

## 2023-02-16 PROCEDURE — 99214 OFFICE O/P EST MOD 30 MIN: CPT

## 2023-02-16 NOTE — HISTORY OF PRESENT ILLNESS
[FreeTextEntry1] : Patient is a 36 yo M who presents for 1 year of scrotal swelling.\par He is referred by Dr Gonsalez for evaluation.\par He has worn tight compressive underwear.  He has also been given lasix which does help somewhat.\par \par His sexual function has been impaired.  He feels his erections are not "full", he has not engaged in sexual intercourse.  He does masturbate.  He did try cialis, that time it was quite firm 9.5/10.  Without meds 7.5/10 rigidity.\par \par He has undergone CT 9/2022 and US imaging which has been unrevealing for cause.  There are R small varicocele  on scrotal US 4/2022.\par He has undergone cardiac evaluation without cardiac issue.\par \par He denies trauma, infection or obvious inciting factor.

## 2023-02-16 NOTE — ASSESSMENT
[FreeTextEntry1] : Patient is a 38 yo M who presents for scrotal lymphedema.\par \par D/w pt conservative measures such as compressive underwear, elevation - pt states that he has attempted already\par D/w pt that surgical excision is another option but there are significant risks.  Wound complications, recurrence of lymphedema, infection, injury to testes/urethra or surrounding structure, bleeding.\par D/w pt that lymphedema can come back and also unclear if his erections will improve after surgery.\par D/w pt that surgery may require to be staged and that his recovery will be prolonged and may take months\par \par Pt is very frustrated and wishes to undergo surgery.  He states he has not been sexually active with a partner due to this condition.\par \par OR order placed

## 2023-02-16 NOTE — PHYSICAL EXAM
[General Appearance - Well Developed] : well developed [General Appearance - Well Nourished] : well nourished [Normal Appearance] : normal appearance [Well Groomed] : well groomed [General Appearance - In No Acute Distress] : no acute distress [Urethral Meatus] : meatus normal [FreeTextEntry1] : no LE edema

## 2023-03-03 ENCOUNTER — APPOINTMENT (OUTPATIENT)
Dept: UROLOGY | Facility: CLINIC | Age: 38
End: 2023-03-03

## 2023-03-03 ENCOUNTER — OUTPATIENT (OUTPATIENT)
Dept: OUTPATIENT SERVICES | Facility: HOSPITAL | Age: 38
LOS: 1 days | End: 2023-03-03
Payer: MEDICAID

## 2023-03-03 VITALS
HEART RATE: 92 BPM | WEIGHT: 244.05 LBS | RESPIRATION RATE: 16 BRPM | HEIGHT: 71 IN | DIASTOLIC BLOOD PRESSURE: 84 MMHG | SYSTOLIC BLOOD PRESSURE: 129 MMHG | OXYGEN SATURATION: 97 % | TEMPERATURE: 97 F

## 2023-03-03 DIAGNOSIS — I89.0 LYMPHEDEMA, NOT ELSEWHERE CLASSIFIED: Chronic | ICD-10-CM

## 2023-03-03 DIAGNOSIS — N43.3 HYDROCELE, UNSPECIFIED: Chronic | ICD-10-CM

## 2023-03-03 DIAGNOSIS — Z98.890 OTHER SPECIFIED POSTPROCEDURAL STATES: Chronic | ICD-10-CM

## 2023-03-03 DIAGNOSIS — Z01.818 ENCOUNTER FOR OTHER PREPROCEDURAL EXAMINATION: ICD-10-CM

## 2023-03-03 DIAGNOSIS — I89.0 LYMPHEDEMA, NOT ELSEWHERE CLASSIFIED: ICD-10-CM

## 2023-03-03 LAB
ANION GAP SERPL CALC-SCNC: 13 MMOL/L — SIGNIFICANT CHANGE UP (ref 5–17)
BUN SERPL-MCNC: 12 MG/DL — SIGNIFICANT CHANGE UP (ref 7–23)
CALCIUM SERPL-MCNC: 9.3 MG/DL — SIGNIFICANT CHANGE UP (ref 8.4–10.5)
CHLORIDE SERPL-SCNC: 103 MMOL/L — SIGNIFICANT CHANGE UP (ref 96–108)
CO2 SERPL-SCNC: 21 MMOL/L — LOW (ref 22–31)
CREAT SERPL-MCNC: 1.03 MG/DL — SIGNIFICANT CHANGE UP (ref 0.5–1.3)
EGFR: 97 ML/MIN/1.73M2 — SIGNIFICANT CHANGE UP
GLUCOSE SERPL-MCNC: 93 MG/DL — SIGNIFICANT CHANGE UP (ref 70–99)
HCT VFR BLD CALC: 41.9 % — SIGNIFICANT CHANGE UP (ref 39–50)
HGB BLD-MCNC: 14.1 G/DL — SIGNIFICANT CHANGE UP (ref 13–17)
MCHC RBC-ENTMCNC: 30.5 PG — SIGNIFICANT CHANGE UP (ref 27–34)
MCHC RBC-ENTMCNC: 33.7 GM/DL — SIGNIFICANT CHANGE UP (ref 32–36)
MCV RBC AUTO: 90.5 FL — SIGNIFICANT CHANGE UP (ref 80–100)
NRBC # BLD: 0 /100 WBCS — SIGNIFICANT CHANGE UP (ref 0–0)
PLATELET # BLD AUTO: 204 K/UL — SIGNIFICANT CHANGE UP (ref 150–400)
POTASSIUM SERPL-MCNC: 4 MMOL/L — SIGNIFICANT CHANGE UP (ref 3.5–5.3)
POTASSIUM SERPL-SCNC: 4 MMOL/L — SIGNIFICANT CHANGE UP (ref 3.5–5.3)
RBC # BLD: 4.63 M/UL — SIGNIFICANT CHANGE UP (ref 4.2–5.8)
RBC # FLD: 14.2 % — SIGNIFICANT CHANGE UP (ref 10.3–14.5)
SODIUM SERPL-SCNC: 137 MMOL/L — SIGNIFICANT CHANGE UP (ref 135–145)
WBC # BLD: 3.5 K/UL — LOW (ref 3.8–10.5)
WBC # FLD AUTO: 3.5 K/UL — LOW (ref 3.8–10.5)

## 2023-03-03 PROCEDURE — 85027 COMPLETE CBC AUTOMATED: CPT

## 2023-03-03 PROCEDURE — G0463: CPT

## 2023-03-03 PROCEDURE — 80048 BASIC METABOLIC PNL TOTAL CA: CPT

## 2023-03-03 RX ORDER — SODIUM CHLORIDE 9 MG/ML
1000 INJECTION, SOLUTION INTRAVENOUS
Refills: 0 | Status: DISCONTINUED | OUTPATIENT
Start: 2023-03-22 | End: 2023-04-05

## 2023-03-03 NOTE — H&P PST ADULT - ASSESSMENT
Airway : no airway abnormalities , denies prior anesthesia complications   Mallampati : II  Denies loose teeth    Yasmani abrasion risk : Denies

## 2023-03-03 NOTE — H&P PST ADULT - OTHER CARE PROVIDERS
cardio Dr. Cristian Paredes ( allscripts ) initial cardiac eval due to scrotum edema 1/11/2023 , eval, EKG and Echo on chart - negative workup

## 2023-03-03 NOTE — H&P PST ADULT - NSICDXPASTMEDICALHX_GEN_ALL_CORE_FT
PAST MEDICAL HISTORY:  History of COVID-19     Hydrocele     Lymphedema of scrotum     Obesity (BMI 30.0-34.9)

## 2023-03-03 NOTE — H&P PST ADULT - HISTORY OF PRESENT ILLNESS
37 yr old M, BMI 34, reports scrotum edema for the past year. Underwent cardiology evaluation that was negative, taking Furosemide daily to decrease edema without significant improvement. Presents to [facility] for scheduled excision of scrotal lymphedema reconstruction/ scrotoplasty on 3/22/2023. Denies fever, chills, no acute complaints. Had Covid 4/2020 was hospitalized and treated with oxygen and antibiotics. Denies any complications. Vaccinated. No recent Covid exposure. Covid PCR 3/20 @ [facility].

## 2023-03-03 NOTE — H&P PST ADULT - GENITOURINARY COMMENTS
reports scrotum edema Scrotum edema, hydrocele unknown etiology. Cardiac work up negative. Taking Furosemide with minimal improvement .

## 2023-03-03 NOTE — H&P PST ADULT - PROBLEM SELECTOR PLAN 1
excision of scrotal lymphedema  reconstruction /scrotoplasty   PST instructions provided, patient verbalized understanding   CBC, BMP collected and sent   Covid PCR 3/20/2023 @ [facility]  Cardiology eval with EKG/ECHO on chart /allscripts

## 2023-03-21 ENCOUNTER — TRANSCRIPTION ENCOUNTER (OUTPATIENT)
Age: 38
End: 2023-03-21

## 2023-03-22 ENCOUNTER — TRANSCRIPTION ENCOUNTER (OUTPATIENT)
Age: 38
End: 2023-03-22

## 2023-03-22 ENCOUNTER — OUTPATIENT (OUTPATIENT)
Dept: OUTPATIENT SERVICES | Facility: HOSPITAL | Age: 38
LOS: 1 days | End: 2023-03-22
Payer: MEDICAID

## 2023-03-22 ENCOUNTER — APPOINTMENT (OUTPATIENT)
Dept: UROLOGY | Facility: HOSPITAL | Age: 38
End: 2023-03-22

## 2023-03-22 VITALS
HEIGHT: 71 IN | WEIGHT: 244.05 LBS | RESPIRATION RATE: 18 BRPM | SYSTOLIC BLOOD PRESSURE: 143 MMHG | TEMPERATURE: 97 F | DIASTOLIC BLOOD PRESSURE: 93 MMHG | HEART RATE: 89 BPM | OXYGEN SATURATION: 98 %

## 2023-03-22 DIAGNOSIS — I89.0 LYMPHEDEMA, NOT ELSEWHERE CLASSIFIED: ICD-10-CM

## 2023-03-22 DIAGNOSIS — Z98.890 OTHER SPECIFIED POSTPROCEDURAL STATES: Chronic | ICD-10-CM

## 2023-03-22 PROCEDURE — 14040 TIS TRNFR F/C/C/M/N/A/G/H/F: CPT

## 2023-03-22 PROCEDURE — 55180 REVISION OF SCROTUM: CPT | Mod: 59

## 2023-03-22 PROCEDURE — 55150 REMOVAL OF SCROTUM: CPT

## 2023-03-22 PROCEDURE — 88305 TISSUE EXAM BY PATHOLOGIST: CPT | Mod: 26

## 2023-03-22 PROCEDURE — 54640 ORCHIOPEXY INGUN/SCROT APPR: CPT | Mod: 50

## 2023-03-22 RX ORDER — POTASSIUM CHLORIDE 20 MEQ
1 PACKET (EA) ORAL
Qty: 0 | Refills: 0 | DISCHARGE

## 2023-03-22 RX ORDER — CHLORHEXIDINE GLUCONATE 213 G/1000ML
1 SOLUTION TOPICAL ONCE
Refills: 0 | Status: COMPLETED | OUTPATIENT
Start: 2023-03-22 | End: 2023-03-22

## 2023-03-22 RX ORDER — CEFAZOLIN SODIUM 1 G
2000 VIAL (EA) INJECTION ONCE
Refills: 0 | Status: COMPLETED | OUTPATIENT
Start: 2023-03-22 | End: 2023-03-22

## 2023-03-22 RX ORDER — ONDANSETRON 8 MG/1
4 TABLET, FILM COATED ORAL EVERY 6 HOURS
Refills: 0 | Status: DISCONTINUED | OUTPATIENT
Start: 2023-03-22 | End: 2023-04-05

## 2023-03-22 RX ORDER — IBUPROFEN 200 MG
1 TABLET ORAL
Qty: 0 | Refills: 0 | DISCHARGE
Start: 2023-03-22

## 2023-03-22 RX ORDER — CEFAZOLIN SODIUM 1 G
2000 VIAL (EA) INJECTION EVERY 8 HOURS
Refills: 0 | Status: DISCONTINUED | OUTPATIENT
Start: 2023-03-22 | End: 2023-04-05

## 2023-03-22 RX ORDER — FUROSEMIDE 40 MG
1 TABLET ORAL
Qty: 0 | Refills: 0 | DISCHARGE

## 2023-03-22 RX ORDER — ACETAMINOPHEN WITH CODEINE 300MG-30MG
1 TABLET ORAL
Qty: 0 | Refills: 0 | DISCHARGE
Start: 2023-03-22

## 2023-03-22 RX ORDER — FUROSEMIDE 40 MG
40 TABLET ORAL DAILY
Refills: 0 | Status: DISCONTINUED | OUTPATIENT
Start: 2023-03-22 | End: 2023-04-05

## 2023-03-22 RX ORDER — IBUPROFEN 200 MG
600 TABLET ORAL EVERY 6 HOURS
Refills: 0 | Status: DISCONTINUED | OUTPATIENT
Start: 2023-03-22 | End: 2023-04-05

## 2023-03-22 RX ORDER — LIDOCAINE HCL 20 MG/ML
0.2 VIAL (ML) INJECTION ONCE
Refills: 0 | Status: COMPLETED | OUTPATIENT
Start: 2023-03-22 | End: 2023-03-22

## 2023-03-22 RX ORDER — ACETAMINOPHEN WITH CODEINE 300MG-30MG
1 TABLET ORAL EVERY 4 HOURS
Refills: 0 | Status: DISCONTINUED | OUTPATIENT
Start: 2023-03-22 | End: 2023-03-23

## 2023-03-22 RX ORDER — FUROSEMIDE 40 MG
1 TABLET ORAL
Qty: 0 | Refills: 0 | DISCHARGE
Start: 2023-03-22

## 2023-03-22 RX ADMIN — Medication 600 MILLIGRAM(S): at 19:31

## 2023-03-22 RX ADMIN — Medication 1 TABLET(S): at 19:45

## 2023-03-22 RX ADMIN — Medication 100 MILLIGRAM(S): at 20:41

## 2023-03-22 RX ADMIN — Medication 600 MILLIGRAM(S): at 20:00

## 2023-03-22 RX ADMIN — SODIUM CHLORIDE 100 MILLILITER(S): 9 INJECTION, SOLUTION INTRAVENOUS at 10:07

## 2023-03-22 RX ADMIN — Medication 1 TABLET(S): at 20:00

## 2023-03-22 NOTE — ASU PATIENT PROFILE, ADULT - MEDICATIONS BROUGHT TO HOSPITAL, PROFILE
Subjective:       Patient ID: Prashant Gerardo is a 73 y.o. male who was referred by Walker Frank Jr., MD    Chief Complaint:   Chief Complaint   Patient presents with    Benign Prostatic Hypertrophy       Benign Prostatic Hypertrophy  Patient complains of lower urinary tract symptoms. He reports frequency, intermittency, nocturia two times a night, and weak stream. He denies incomplete emptying and straining. Patient states symptoms are of mild severity. Onset of symptoms was several years ago and was gradual in onset. His AUA Symptom Score is, 6/3 manifested as irritative symptoms including frequency, nocturia and obstructive symptoms including weak stream. He has no personal history and no family history of prostate cancer. He reports a history of no complicating symptoms. He denies flank pain, gross hematuria, kidney stones, and recurrent UTI.  He had a CT A/P recently showing a thickened bladder wall with an enlarged prostate and median lobe.    IPSS Questionnaire (AUA-7):  Over the past month    1)  How often have you had a sensation of not emptying your bladder completely after you finish urinating?  0 - Not at all   2)  How often have you had to urinate again less than two hours after you finished urinating? 2 - Less than half the time   3)  How often have you found you stopped and started again several times when you urinated?  1 - Less than 1 time in 5   4) How difficult have you found it to postpone urination?  0 - Not at all   5) How often have you had a weak urinary stream?  1 - Less than 1 time in 5   6) How often have you had to push or strain to begin urination?  0 - Not at all   7) How many times did you most typically get up to urinate from the time you went to bed until the time you got up in the morning?  2 - 2 times   Total score:  0-7 mildly symptomatic 6/3    8-19 moderately symptomatic    20-35 severely symptomatic      Erectile Dysfunction  Patient complains of erectile dysfunction.  Onset of dysfunction was several years ago and was gradual in onset.  Patient states the nature of difficulty is both attaining and maintaining erection. Full erections occur never. Partial erections occur with intercourse. Libido is not affected. Risk factors for ED include cardiovascular disease. Patient denies history of pelvic radiation. Previous treatment of ED includes Viagra.     ACTIVE MEDICAL ISSUES:  Patient Active Problem List   Diagnosis    Essential hypertension    Palpitations    Tobacco use    Hand pain    Right hand weakness    Chest pain, atypical    Chronic diastolic congestive heart failure    Sleep-related breathing disorder    History of nicotine dependence    Dyspnea on exertion    Complex sleep apnea syndrome    ILD (interstitial lung disease)    Severe obesity (BMI 35.0-39.9) with comorbidity       PAST MEDICAL HISTORY  Past Medical History:   Diagnosis Date    Anemia     Arthritis     Colon polyps     Cubital tunnel syndrome     Heart failure     HTN (hypertension)     Mixed sleep apnea 04/06/2021    Severe obesity (BMI 35.0-39.9) with comorbidity 06/09/2021    Tobacco dependence        PAST SURGICAL HISTORY:  Past Surgical History:   Procedure Laterality Date    WRIST SURGERY         SOCIAL HISTORY:  Social History     Tobacco Use    Smoking status: Former     Packs/day: 1.00     Years: 54.00     Pack years: 54.00     Types: Cigarettes     Quit date: 2/18/2019     Years since quitting: 3.6    Smokeless tobacco: Never    Tobacco comments:     started age 16   Substance Use Topics    Alcohol use: Yes     Comment: Socially       FAMILY HISTORY:  Family History   Problem Relation Age of Onset    No Known Problems Mother     No Known Problems Father     Kidney disease Neg Hx     Diabetes Neg Hx        ALLERGIES AND MEDICATIONS: updated and reviewed.  Review of patient's allergies indicates:  No Known Allergies  Current Outpatient Medications   Medication Sig    atorvastatin (LIPITOR) 20 MG  "tablet TAKE 1 TABLET EVERY DAY    ibuprofen (ADVIL,MOTRIN) 200 MG tablet Take 200 mg by mouth 2 (two) times daily as needed.    lisinopriL-hydrochlorothiazide (PRINZIDE,ZESTORETIC) 20-12.5 mg per tablet TAKE 1 TABLET EVERY DAY    MULTIVIT-IRON-MIN-FOLIC ACID 3,500-18-0.4 UNIT-MG-MG ORAL CHEW Take by mouth.    tadalafiL (CIALIS) 20 MG Tab Take 1 tablet (20 mg total) by mouth once daily.     No current facility-administered medications for this visit.       Review of Systems   Constitutional:  Negative for activity change, fatigue, fever and unexpected weight change.   HENT:  Negative for congestion.    Eyes:  Negative for redness.   Respiratory:  Negative for chest tightness and shortness of breath.    Cardiovascular:  Negative for chest pain and leg swelling.   Gastrointestinal:  Negative for abdominal pain, constipation, diarrhea, nausea and vomiting.   Genitourinary:  Negative for dysuria, flank pain, frequency, hematuria, penile pain, penile swelling, scrotal swelling, testicular pain and urgency.   Musculoskeletal:  Negative for arthralgias and back pain.   Neurological:  Negative for dizziness and light-headedness.   Psychiatric/Behavioral:  Negative for behavioral problems and confusion. The patient is not nervous/anxious.    All other systems reviewed and are negative.    Objective:      Vitals:    10/24/22 1319   Weight: 96.3 kg (212 lb 4.9 oz)   Height: 5' 3" (1.6 m)     Physical Exam  Vitals and nursing note reviewed.   Constitutional:       Appearance: He is well-developed.   HENT:      Head: Normocephalic.   Eyes:      Conjunctiva/sclera: Conjunctivae normal.   Neck:      Thyroid: No thyromegaly.      Trachea: No tracheal deviation.   Cardiovascular:      Rate and Rhythm: Normal rate.      Heart sounds: Normal heart sounds.   Pulmonary:      Effort: Pulmonary effort is normal. No respiratory distress.      Breath sounds: Normal breath sounds. No wheezing.   Abdominal:      General: Bowel sounds are " normal. There is no distension.      Palpations: Abdomen is soft. There is no mass.      Tenderness: There is no abdominal tenderness. There is no guarding or rebound.      Hernia: No hernia is present. There is no hernia in the right inguinal area or left inguinal area.   Genitourinary:     Penis: Normal.       Testes: Normal.         Right: Mass or tenderness not present.         Left: Mass or tenderness not present.      Prostate: Enlarged. Not tender.      Rectum: Normal. No mass, tenderness or external hemorrhoid.      Comments: 40 gm smooth  Musculoskeletal:         General: No tenderness. Normal range of motion.      Cervical back: Normal range of motion and neck supple.   Lymphadenopathy:      Cervical: No cervical adenopathy.      Lower Body: No right inguinal adenopathy. No left inguinal adenopathy.   Skin:     General: Skin is warm and dry.      Findings: No erythema or rash.   Neurological:      Mental Status: He is alert and oriented to person, place, and time.   Psychiatric:         Behavior: Behavior normal.         Thought Content: Thought content normal.         Judgment: Judgment normal.       Urine dipstick shows negative for all components.  Micro exam: negative for WBC's or RBC's.      Component Ref Range & Units 3 d ago    PSA Diagnostic 0.00 - 4.00 ng/mL 1.8    Comment: The testing method is a chemiluminescent microparticle immunoassay   manufactured by [employer] and performed on the [employer]   or   [employer] system. Values obtained with different assay manufacturers   for   methods may be different and cannot be used interchangeably.   PSA Expected levels:   Hormonal Therapy: <0.05 ng/ml   Prostatectomy: <0.01 ng/ml   Radiation Therapy: <1.00 ng/ml    Resulting Agency  [facility]           Narrative  [facility]      Specimen Collected: 10/21/22 16:40 Last Resulted: 10/22/22 15:35             Assessment:       1. Benign prostatic hyperplasia, unspecified whether  lower urinary tract symptoms present    2. Erectile dysfunction, unspecified erectile dysfunction type    3. Nocturia          Plan:       1. Benign prostatic hyperplasia, unspecified whether lower urinary tract symptoms present  Doing well    - Ambulatory referral/consult to Urology    2. Erectile dysfunction, unspecified erectile dysfunction type    - tadalafiL (CIALIS) 20 MG Tab; Take 1 tablet (20 mg total) by mouth once daily.  Dispense: 10 tablet; Refill: 11    3. Nocturia    - POCT urinalysis, dipstick or tablet reag  - US Retroperitoneal Complete (Kidney and; Future          No follow-ups on file.       no

## 2023-03-22 NOTE — ASU DISCHARGE PLAN (ADULT/PEDIATRIC) - NURSING INSTRUCTIONS
OK to take Tylenol/Acetaminophen at ________________ for pain and every 6 hours after as needed. OK to take Motrin/Ibuprofen at ____________ for pain and every 6 hours after as needed.

## 2023-03-22 NOTE — ASU DISCHARGE PLAN (ADULT/PEDIATRIC) - CARE PROVIDER_API CALL
Aleks Lopez)  Urology  [address] [place] [address], [place] [address]  Phone: [phone]  Fax: [phone]  Follow Up Time:

## 2023-03-22 NOTE — ASU PREOP CHECKLIST - WEIGHT IN KG
ED ADMIT SNAPSHOT    Diagnosis:        Safety:  other none    Isolation:  None    Mobility:  Independent    Cognition:  alert and oriented x3    Behavioral Concerns: very anxious.    Last set of vitals:  Patient Vitals for the past 24 hrs:   BP Temp Temp src Pulse Resp SpO2 Height Weight   04/21/17 2310 134/68 - - 98 22 100 % - -   04/21/17 2240 125/64 - - - - - - -   04/21/17 2238 - - - 100 23 98 % - -   04/21/17 2131 - - - 96 20 100 % - -   04/21/17 2130 127/58 - - - - - - -   04/21/17 2100 128/58 - - 96 21 100 % - -   04/21/17 2031 - - - 96 22 100 % - -   04/21/17 2030 120/58 - - - - - - -   04/21/17 2019 135/62 - - - - - - -   04/21/17 2015 - - - 92 11 100 % - -   04/21/17 1930 128/57 - - 94 21 100 % - -   04/21/17 1901 - - - 96 16 100 % - -   04/21/17 1900 128/58 - - - - - - -   04/21/17 1804 129/78 97.8 °F (36.6 °C) Axillary 104 20 100 % 5' 9\" (1.753 m) (!) 154.2 kg       Critical Labs: Yes lactic 2.6    Access:  PIV  20g  Left  AC    Infusions: None    Last dose of pain medications given:   No pain meds given    Medication Reconciliation: Med History Tech aware    Special Considerations: pt needs coumadin dose yet tonight & pharmacist requested we wait to give it until med rec is clarified as pt is prescribed cipro & that can affect PT INR    Phone Call Requested by ED Nurse (after patient has arrived to inpatient unit): No       Zee Leon, RN  Extension:  [phone]         110.7

## 2023-03-22 NOTE — BRIEF OPERATIVE NOTE - NSICDXBRIEFPROCEDURE_GEN_ALL_CORE_FT
PROCEDURES:  Excision, lesion, penis or scrotum 22-Mar-2023 15:35:35 excision lymphedema Radha Rivera

## 2023-03-22 NOTE — CHART NOTE - NSCHARTNOTEFT_GEN_A_CORE
Patient seen in PACU resting without complaints.  No Chest Pain, SOB, N/V.    T(C): 36.3 (03-22-23 @ 17:50), Max: 36.9 (03-22-23 @ 15:25)  HR: 78 (03-22-23 @ 19:45) (73 - 90)  BP: 120/61 (03-22-23 @ 19:30) (109/77 - 143/93)  RR: 15 (03-22-23 @ 19:45) (12 - 18)  SpO2: 95% (03-22-23 @ 19:45) (93% - 98%)  Wt(kg): --    Exam:  Alert and Oriented, No Acute Distress  Laterality: Surgical dressing telfa/tegaderm C/D/I  ROS in place maintaining good suction  Banks in place  +PF/DF/EHL/FHL  SILT  + DP pulses    Xray:----             A/P: 37yMale S/p  Excision scrotal lymphedema. VSS. NAD  -PT/OT-Bedrest overnight  -IS encouraged  -DVT PPx with venodynes  -TOV in AM  -Pain Control  -23 hr stay    Adriana Rutherford PA-C  Team Pager #[phone]

## 2023-03-22 NOTE — ASU PATIENT PROFILE, ADULT - FALL HARM RISK - UNIVERSAL INTERVENTIONS
Bed in lowest position, wheels locked, appropriate side rails in place/Call bell, personal items and telephone in reach/Instruct patient to call for assistance before getting out of bed or chair/Non-slip footwear when patient is out of bed/[place] to call system/Physically safe environment - no spills, clutter or unnecessary equipment/Purposeful Proactive Rounding/Room/bathroom lighting operational, light cord in reach

## 2023-03-22 NOTE — ASU DISCHARGE PLAN (ADULT/PEDIATRIC) - ASU DC SPECIAL INSTRUCTIONSFT
BATHING: Please do not submerge wound underwater. You may shower and let water run over the incisions. Do not scrub with soap. Keep incisions clean, you may apply bacitracin (over the counter) for first 3 days after dressing removal, then you may use regular vaseline or other petroleum jelly.    PAIN: You may take pain medication that is given to you if you feel it is needed. You may have some intermittent pain for up to six (6) weeks post operatively. Pain does not signify any problem unless associated with fever, chills, or inability to void.  If you experience any fevers or chills please call immediately as this may be signs of an infection.  If you were prescribed Percocet or Norco, be aware that these medications contain acetaminophen.  Therefore, do not take more than 3000mg of acetaminophen in 24 hours if also taking OTC Tylenol.  You are likely to have a sensation of pulling and discomfort on one or both sides that may last as long as several months. This is a natural sensation of healing.    ACTIVITY: Please wear the scrotal support or supportive underwear, this will help with some of the swelling and pain. No heavy lifting or straddle activities such as bicycle or sexual intercourse until your follow-up appointment. Otherwise, you may return to your usual level of physical activity.    DIET: Return to your usual diet, begin with liquid foods and progress slowly.    NOTIFY YOUR SURGEON IF: You have any bleeding that does not stop, any pus draining from your wound, any fever (over 100.4 F) or chills, persistent nausea/vomiting, persistent diarrhea, or if your pain is not controlled on your discharge pain medications.    FOLLOW-UP: Once you arrive home, please call the office to schedule a follow-up within 1-2 weeks. BATHING: Please do not submerge wound underwater. You may shower and let water run over the incisions. Do not scrub with soap. Keep incisions clean, you may apply bacitracin (over the counter) for first 3 days after dressing removal, then you may use regular vaseline or other petroleum jelly.  ******************************************************************************************   PAIN: You may take pain medication that is given to you if you feel it is needed. You may have some intermittent pain for up to six (6) weeks post operatively. Pain does not signify any problem unless associated with fever, chills, or inability to void.  If you experience any fevers or chills please call immediately as this may be signs of an infection.  ******************************************************************************************   If you were prescribed Percocet or Norco, be aware that these medications contain acetaminophen.  Therefore, do not take more than 3000mg of acetaminophen in 24 hours if also taking OTC Tylenol.  You are likely to have a sensation of pulling and discomfort on one or both sides that may last as long as several months. This is a natural sensation of healing.  ******************************************************************************************   ACTIVITY: Please wear the scrotal support or supportive underwear, this will help with some of the swelling and pain. No heavy lifting or straddle activities such as bicycle or sexual intercourse until your follow-up appointment. Otherwise, you may return to your usual level of physical activity.  ******************************************************************************************   DIET: Return to your usual diet, begin with liquid foods and progress slowly.  ******************************************************************************************   NOTIFY YOUR SURGEON IF: You have any bleeding that does not stop, any pus draining from your wound, any fever (over 100.4 F) or chills, persistent nausea/vomiting, persistent diarrhea, or if your pain is not controlled on your discharge pain medications.  ******************************************************************************************   FOLLOW-UP: Once you arrive home, please call the office to schedule a follow-up within 1-2 weeks.

## 2023-03-23 VITALS
SYSTOLIC BLOOD PRESSURE: 125 MMHG | OXYGEN SATURATION: 96 % | DIASTOLIC BLOOD PRESSURE: 70 MMHG | HEART RATE: 65 BPM | RESPIRATION RATE: 15 BRPM

## 2023-03-23 PROCEDURE — 55150 REMOVAL OF SCROTUM: CPT

## 2023-03-23 PROCEDURE — 54640 ORCHIOPEXY INGUN/SCROT APPR: CPT | Mod: 50

## 2023-03-23 PROCEDURE — C9399: CPT

## 2023-03-23 PROCEDURE — 88305 TISSUE EXAM BY PATHOLOGIST: CPT

## 2023-03-23 RX ORDER — IBUPROFEN 200 MG
1 TABLET ORAL
Qty: 20 | Refills: 0
Start: 2023-03-23 | End: 2023-03-27

## 2023-03-23 RX ORDER — ACETAMINOPHEN WITH CODEINE 300MG-30MG
1 TABLET ORAL
Qty: 12 | Refills: 0
Start: 2023-03-23 | End: 2023-03-25

## 2023-03-23 RX ADMIN — Medication 100 MILLIGRAM(S): at 05:02

## 2023-03-23 RX ADMIN — Medication 1 TABLET(S): at 04:07

## 2023-03-23 RX ADMIN — Medication 1 TABLET(S): at 05:53

## 2023-03-23 RX ADMIN — Medication 600 MILLIGRAM(S): at 01:25

## 2023-03-23 RX ADMIN — Medication 600 MILLIGRAM(S): at 01:26

## 2023-03-23 RX ADMIN — Medication 600 MILLIGRAM(S): at 08:09

## 2023-03-23 RX ADMIN — Medication 40 MILLIGRAM(S): at 05:54

## 2023-03-28 PROBLEM — I89.0 LYMPHEDEMA, NOT ELSEWHERE CLASSIFIED: Chronic | Status: ACTIVE | Noted: 2023-03-03

## 2023-03-28 PROBLEM — N43.3 HYDROCELE, UNSPECIFIED: Chronic | Status: ACTIVE | Noted: 2023-03-03

## 2023-03-28 PROBLEM — E66.9 OBESITY, UNSPECIFIED: Chronic | Status: ACTIVE | Noted: 2023-03-03

## 2023-03-28 PROBLEM — Z86.16 PERSONAL HISTORY OF COVID-19: Chronic | Status: ACTIVE | Noted: 2023-03-03

## 2023-03-28 LAB — SURGICAL PATHOLOGY STUDY: SIGNIFICANT CHANGE UP

## 2023-03-30 ENCOUNTER — APPOINTMENT (OUTPATIENT)
Dept: UROLOGY | Facility: CLINIC | Age: 38
End: 2023-03-30
Payer: MEDICAID

## 2023-03-30 VITALS
BODY MASS INDEX: 33.05 KG/M2 | DIASTOLIC BLOOD PRESSURE: 88 MMHG | TEMPERATURE: 97.6 F | OXYGEN SATURATION: 100 % | SYSTOLIC BLOOD PRESSURE: 134 MMHG | HEART RATE: 83 BPM | WEIGHT: 237 LBS | RESPIRATION RATE: 18 BRPM

## 2023-03-30 DIAGNOSIS — I89.0 LYMPHEDEMA, NOT ELSEWHERE CLASSIFIED: ICD-10-CM

## 2023-03-30 DIAGNOSIS — N50.89 OTHER SPECIFIED DISORDERS OF THE MALE GENITAL ORGANS: ICD-10-CM

## 2023-03-30 PROCEDURE — 99024 POSTOP FOLLOW-UP VISIT: CPT

## 2023-03-30 NOTE — HISTORY OF PRESENT ILLNESS
[FreeTextEntry1] : Patient is a 36 yo M who presents for 1 year of scrotal swelling.\par He is referred by Dr Gonsalez for evaluation.\par He has worn tight compressive underwear. He has also been given lasix which does help somewhat.\par \par His sexual function has been impaired. He feels his erections are not "full", he has not engaged in sexual intercourse. He does masturbate. He did try cialis, that time it was quite firm 9.5/10. Without meds 7.5/10 rigidity.\par \par He has undergone CT 9/2022 and US imaging which has been unrevealing for cause. There are R small varicocele on scrotal US 4/2022.\par He has undergone cardiac evaluation without cardiac issue.\par \par He denies trauma, infection or obvious inciting factor. \par \par 3/30/2023\par He presents for follow up, status post excision of scrotal lymphedema, complex reconstruction of the scrotum/scrotoplasty, creation of perineal flap for closure, bilateral orchiopexy on 3/22/23\par \par He states swelling resolved, no pain.  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  He states he is very happy and grateful.

## 2023-03-30 NOTE — ASSESSMENT
[FreeTextEntry1] : Patient is a 36 yo M who presents for scrotal lymphedema.\par S/p excision of scrotal lymphedema, complex reconstruction of the scrotum/scrotoplasty, creation of perineal flap for closure, bilateral orchiopexy on 3/22/23\par \par Doing very well postop\par Again reviewed activity limitations, ok to resume work as pt states he will not lift any heavy objects\par F/u 1 mo

## 2023-03-30 NOTE — PHYSICAL EXAM
[General Appearance - Well Developed] : well developed [General Appearance - Well Nourished] : well nourished [Normal Appearance] : normal appearance [Well Groomed] : well groomed [General Appearance - In No Acute Distress] : no acute distress [FreeTextEntry1] : well healing intact scrotal/penoscrotal suture line.  There is mild postop edema.  Scrotum is significantly smaller than previously. [] : no respiratory distress [Respiration, Rhythm And Depth] : normal respiratory rhythm and effort [Exaggerated Use Of Accessory Muscles For Inspiration] : no accessory muscle use [Oriented To Time, Place, And Person] : oriented to person, place, and time [Affect] : the affect was normal [Mood] : the mood was normal [Not Anxious] : not anxious

## 2023-04-17 ENCOUNTER — RX CHANGE (OUTPATIENT)
Age: 38
End: 2023-04-17

## 2023-05-04 ENCOUNTER — APPOINTMENT (OUTPATIENT)
Dept: UROLOGY | Facility: CLINIC | Age: 38
End: 2023-05-04

## 2024-03-14 NOTE — PROGRESS NOTE ADULT - SUBJECTIVE AND OBJECTIVE BOX
Patient is a 34y old  Male who presents with a chief complaint of Covid (04 May 2020 10:34)      INTERVAL HPI/OVERNIGHT EVENTS:  Pt was seen and examined, no acute events.    MEDICATIONS  (STANDING):  enoxaparin Injectable 40 milliGRAM(s) SubCutaneous every 12 hours    MEDICATIONS  (PRN):  acetaminophen   Tablet .. 650 milliGRAM(s) Oral every 4 hours PRN Temp greater or equal to 38.5C (101.3F)  acetaminophen   Tablet .. 650 milliGRAM(s) Oral every 6 hours PRN Mild Pain (1 - 3)  acetaminophen  Suppository .. 650 milliGRAM(s) Rectal every 4 hours PRN Temp greater or equal to 38.5C (101.3F)  ALBUTerol    90 MICROgram(s) HFA Inhaler 2 Puff(s) Inhalation every 4 hours PRN Shortness of Breath and/or Wheezing  ALPRAZolam 0.25 milliGRAM(s) Oral two times a day PRN anxiety  benzocaine 15 mG/menthol 3.6 mG (Sugar-Free) Lozenge 1 Lozenge Oral every 4 hours PRN Sore Throat  benzonatate 100 milliGRAM(s) Oral three times a day PRN Cough  guaifenesin/dextromethorphan  Syrup 10 milliLiter(s) Oral every 4 hours PRN Cough  ondansetron Injectable 4 milliGRAM(s) IV Push every 6 hours PRN Nausea and/or Vomiting      Allergies  No Known Allergies        Vital Signs Last 24 Hrs  T(C): 36.8 (04 May 2020 11:36), Max: 37.1 (03 May 2020 16:15)  T(F): 98.2 (04 May 2020 11:36), Max: 98.7 (03 May 2020 16:15)  HR: 98 (04 May 2020 11:36) (96 - 110)  BP: 106/73 (04 May 2020 11:36) (102/67 - 118/71)  BP(mean): --  RR: 18 (04 May 2020 11:36) (17 - 18)  SpO2: 94% (04 May 2020 11:36) (92% - 94%)      PHYSICAL EXAM:  GENERAL: NAD  HEAD:  Atraumatic  EYES: PERRLA  NERVOUS SYSTEM:  A, O x 3, non focal  CHEST/LUNG: Clear  HEART: RRR  ABDOMEN: Soft, non tender  EXTREMITIES:  no edema      LABS:                        12.9   2.80  )-----------( 182      ( 04 May 2020 07:56 )             40.7     05-04    141  |  109<H>  |  7   ----------------------------<  89  4.0   |  26  |  0.95    Ca    8.5      04 May 2020 07:56    TPro  6.0  /  Alb  2.8<L>  /  TBili  0.4  /  DBili  x   /  AST  67<H>  /  ALT  217<H>  /  AlkPhos  45  05-03        CAPILLARY BLOOD GLUCOSE          RADIOLOGY & ADDITIONAL TESTS:    Imaging Personally Reviewed:  [ ] YES  [ ] NO    Consultant(s) Notes Reviewed:  [ ] YES  [ ] NO    Care Discussed with Consultants/Other Providers [ ] YES  [ ] NO none

## 2025-04-14 NOTE — ED ADULT NURSE NOTE - ISOLATION AIRBORNE OTHER
Started 4-12, qurg urine freq., having to urinate at night, needing to urinate after finishing urinating, going in small amounts has fullness in pelvi area.  Denies fever or back pain.    Pharm correct.   Made appt.  
R/O COVID
